# Patient Record
Sex: FEMALE | Race: BLACK OR AFRICAN AMERICAN | NOT HISPANIC OR LATINO | Employment: FULL TIME | URBAN - METROPOLITAN AREA
[De-identification: names, ages, dates, MRNs, and addresses within clinical notes are randomized per-mention and may not be internally consistent; named-entity substitution may affect disease eponyms.]

---

## 2017-05-17 LAB
EXTERNAL HEPATITIS B SURFACE ANTIGEN: NEGATIVE
EXTERNAL RUBELLA QUALITATIVE: NORMAL
EXTERNAL SYPHILIS RPR SCREEN: NORMAL
EXTERNAL URINE DRUG SCREEN: NEGATIVE
HIV1 P24 AG SERPL QL IA: NORMAL

## 2017-08-02 ENCOUNTER — TRANSCRIBE ORDERS (OUTPATIENT)
Dept: WOMENS IMAGING | Facility: HOSPITAL | Age: 37
End: 2017-08-02

## 2017-08-02 DIAGNOSIS — Z36.89 SCREENING, ANTENATAL, FOR FETAL ANATOMIC SURVEY: ICD-10-CM

## 2017-08-02 DIAGNOSIS — O09.522 AMA (ADVANCED MATERNAL AGE) MULTIGRAVIDA 35+, SECOND TRIMESTER: Primary | ICD-10-CM

## 2017-08-16 ENCOUNTER — OFFICE VISIT (OUTPATIENT)
Dept: OBSTETRICS AND GYNECOLOGY | Facility: HOSPITAL | Age: 37
End: 2017-08-16

## 2017-08-16 ENCOUNTER — HOSPITAL ENCOUNTER (OUTPATIENT)
Dept: WOMENS IMAGING | Facility: HOSPITAL | Age: 37
Discharge: HOME | End: 2017-08-16
Attending: OBSTETRICS & GYNECOLOGY | Admitting: OBSTETRICS & GYNECOLOGY

## 2017-08-16 VITALS
SYSTOLIC BLOOD PRESSURE: 124 MMHG | HEIGHT: 62 IN | BODY MASS INDEX: 36.99 KG/M2 | DIASTOLIC BLOOD PRESSURE: 82 MMHG | WEIGHT: 201 LBS

## 2017-08-16 DIAGNOSIS — O09.512 AMA (ADVANCED MATERNAL AGE) PRIMIGRAVIDA 35+, SECOND TRIMESTER: Primary | ICD-10-CM

## 2017-08-16 DIAGNOSIS — O09.522 AMA (ADVANCED MATERNAL AGE) MULTIGRAVIDA 35+, SECOND TRIMESTER: ICD-10-CM

## 2017-08-16 DIAGNOSIS — Z36.89 SCREENING, ANTENATAL, FOR FETAL ANATOMIC SURVEY: ICD-10-CM

## 2017-08-16 PROBLEM — O09.519 AMA (ADVANCED MATERNAL AGE) PRIMIGRAVIDA 35+: Status: ACTIVE | Noted: 2017-08-16

## 2017-08-16 PROCEDURE — 76811 OB US DETAILED SNGL FETUS: CPT | Performed by: OBSTETRICS & GYNECOLOGY

## 2017-08-16 PROCEDURE — 76811 OB US DETAILED SNGL FETUS: CPT

## 2017-08-16 RX ORDER — PRENATAL WITH FERROUS FUM AND FOLIC ACID 3080; 920; 120; 400; 22; 1.84; 3; 20; 10; 1; 12; 200; 27; 25; 2 [IU]/1; [IU]/1; MG/1; [IU]/1; MG/1; MG/1; MG/1; MG/1; MG/1; MG/1; UG/1; MG/1; MG/1; MG/1; MG/1
1 TABLET ORAL DAILY
COMMUNITY

## 2017-08-16 NOTE — PROGRESS NOTES
Documentation of the ultasound findings, images, and interpretations with be available in the patient's Viewpoint report located in the Chart Review Imaging tab in [employer].

## 2017-10-25 LAB — EXTERNAL GTT 1 HOUR: 121

## 2017-12-06 ENCOUNTER — TRANSCRIBE ORDERS (OUTPATIENT)
Dept: LAB | Facility: HOSPITAL | Age: 37
End: 2017-12-06

## 2017-12-06 ENCOUNTER — LAB (OUTPATIENT)
Dept: LAB | Facility: HOSPITAL | Age: 37
End: 2017-12-06

## 2017-12-06 DIAGNOSIS — Z34.83 PRENATAL CARE, SUBSEQUENT PREGNANCY, THIRD TRIMESTER: ICD-10-CM

## 2017-12-06 DIAGNOSIS — Z34.83 PRENATAL CARE, SUBSEQUENT PREGNANCY, THIRD TRIMESTER: Primary | ICD-10-CM

## 2017-12-06 LAB — EXTERNAL GROUP B STREP ANTIGEN: NEGATIVE

## 2017-12-06 PROCEDURE — 87081 CULTURE SCREEN ONLY: CPT

## 2017-12-09 LAB — BACTERIA SPEC AEROBE CULT: NORMAL

## 2017-12-30 ENCOUNTER — HOSPITAL ENCOUNTER (OUTPATIENT)
Facility: HOSPITAL | Age: 37
Setting detail: OBSERVATION
Discharge: HOME | End: 2017-12-30
Attending: OBSTETRICS & GYNECOLOGY | Admitting: OBSTETRICS & GYNECOLOGY

## 2017-12-30 VITALS
BODY MASS INDEX: 39.75 KG/M2 | SYSTOLIC BLOOD PRESSURE: 127 MMHG | HEART RATE: 86 BPM | RESPIRATION RATE: 18 BRPM | TEMPERATURE: 98.2 F | WEIGHT: 216 LBS | DIASTOLIC BLOOD PRESSURE: 77 MMHG | HEIGHT: 62 IN

## 2017-12-30 PROBLEM — O47.1 FALSE LABOR AFTER 37 WEEKS OF GESTATION WITHOUT DELIVERY: Status: ACTIVE | Noted: 2017-12-30

## 2017-12-30 LAB
BACTERIA UR QL AUTO: ABNORMAL /HPF
BILIRUB UR QL STRIP: NEGATIVE
CLARITY UR: ABNORMAL
COLOR UR: YELLOW
GLUCOSE UR STRIP-MCNC: NEGATIVE MG/DL
HGB UR QL STRIP.AUTO: NEGATIVE
HYALINE CASTS UR QL AUTO: ABNORMAL /LPF
KETONES UR QL STRIP: NEGATIVE
LEUKOCYTE ESTERASE UR QL STRIP.AUTO: NEGATIVE
NITRITE UR QL STRIP: NEGATIVE
PH UR STRIP.AUTO: 6.5 [PH] (ref 5–8)
PROT UR QL STRIP: NEGATIVE
RBC # UR: ABNORMAL /HPF
REF LAB TEST METHOD: ABNORMAL
SP GR UR STRIP: 1.01 (ref 1–1.03)
SQUAMOUS #/AREA URNS HPF: ABNORMAL /HPF
UROBILINOGEN UR QL STRIP: ABNORMAL
WBC UR QL AUTO: ABNORMAL /HPF

## 2017-12-30 PROCEDURE — G0378 HOSPITAL OBSERVATION PER HR: HCPCS

## 2017-12-30 PROCEDURE — 99218 PR INITIAL OBSERVATION CARE/DAY 30 MINUTES: CPT | Performed by: OBSTETRICS & GYNECOLOGY

## 2017-12-30 PROCEDURE — 59025 FETAL NON-STRESS TEST: CPT

## 2017-12-30 PROCEDURE — 81001 URINALYSIS AUTO W/SCOPE: CPT | Performed by: OBSTETRICS & GYNECOLOGY

## 2017-12-30 PROCEDURE — 59025 FETAL NON-STRESS TEST: CPT | Performed by: OBSTETRICS & GYNECOLOGY

## 2018-01-01 ENCOUNTER — HOSPITAL ENCOUNTER (OUTPATIENT)
Facility: HOSPITAL | Age: 38
Discharge: HOME | End: 2018-01-01
Attending: OBSTETRICS & GYNECOLOGY | Admitting: OBSTETRICS & GYNECOLOGY

## 2018-01-01 VITALS
WEIGHT: 216 LBS | DIASTOLIC BLOOD PRESSURE: 70 MMHG | SYSTOLIC BLOOD PRESSURE: 118 MMHG | RESPIRATION RATE: 16 BRPM | BODY MASS INDEX: 39.75 KG/M2 | TEMPERATURE: 98.1 F | HEART RATE: 78 BPM | HEIGHT: 62 IN

## 2018-01-01 PROCEDURE — 99214 OFFICE O/P EST MOD 30 MIN: CPT | Performed by: OBSTETRICS & GYNECOLOGY

## 2018-01-01 PROCEDURE — 59025 FETAL NON-STRESS TEST: CPT

## 2018-01-01 PROCEDURE — 59025 FETAL NON-STRESS TEST: CPT | Performed by: OBSTETRICS & GYNECOLOGY

## 2018-01-01 PROCEDURE — G0463 HOSPITAL OUTPT CLINIC VISIT: HCPCS

## 2018-01-01 NOTE — H&P
[facility]  Obstetric History and Physical    Chief Complaint   Patient presents with   • Decreaed fetal movement     Noticed decreaed movement around 0500   • Rupture of Membranes     ?? Leaking fluid around 0455. Pt denies any sexual intercourse in the last 4 days.       Subjective     Patient is a 37 y.o. female  currently at 39w6d, who presents with a complaint of possible breaking her water and some contractions.   Since arrival, she has had no leaking or bleeding.  +FM.  Her contractions have all but stopped.  She feels the baby move well and has a reactive strip.  She says she feels well and is ready to go home.  She has an appointment on Wednesday        The following portions of the patients history were reviewed and updated as appropriate: current medications, allergies, past medical history, past surgical history, past family history, past social history and problem list .       Prenatal Information:   Prenatal Labs  Lab Results   Component Value Date    GBSANTIGEN Negative 2017         External Prenatal Results         Pregnancy Outside Results - these were transcribed from office records.  See scanned records for details. Date Time   Hgb      Hct      ABO      Rh      Antibody Screen      Glucose Fasting GTT      Glucose Tolerance Test 1 hour      Glucose Tolerance Test 3 hour      Gonorrhea (discrete)      Chlamydia (discrete)      RPR      VDRL      Syphillis Antibody      Rubella      HBsAg      Herpes Simplex Virus PCR      Herpes Simplex VIrus Culture      HIV      Hep C RNA Quant PCR      Hep C Antibody      Urine Drug Screen      AFP      Group B Strep ^ Negative  17    GBS Susceptibility to Clindamycin      GBS Susceptibility to Eythromycin      Fetal Fibronectin      Genetic Testing, Maternal Blood             Legend: ^: Historical            Past OB History:       Obstetric History       T0      L0     SAB0   TAB0   Ectopic0   Multiple0   Live Births0       #  Outcome Date GA Lbr Pk/2nd Weight Sex Delivery Anes PTL Lv   1 Current                   Past Medical History: Past Medical History:   Diagnosis Date   • Abnormal Pap smear of cervix       Past Surgical History History reviewed. No pertinent surgical history.   Family History: History reviewed. No pertinent family history.   Social History:  reports that she quit smoking about 10 months ago. Her smoking use included Cigarettes. She smoked 0.25 packs per day. She has never used smokeless tobacco.   reports that she does not drink alcohol.   reports that she does not use illicit drugs.        General ROS: Reviewed within EPIC.  All pertinent negative except as noted    Objective       Vital Signs Range for the last 24 hours  Temperature: Temp:  [98.1 °F (36.7 °C)-98.4 °F (36.9 °C)] 98.1 °F (36.7 °C)   Temp Source: Temp src: Oral   BP: BP: (118-131)/(69-70) 118/70   Pulse: Heart Rate:  [78-93] 78   Respirations: Resp:  [16-18] 16   SPO2:     O2 Amount (l/min):     O2 Devices     Weight: Weight:  [98 kg (216 lb)] 98 kg (216 lb)     Physical Examination: Alert and oriented X 3  Chest - clear to auscultation, no wheezes, rales or rhonchi, symmetric air entry  Heart - normal rate, S1, S2, no murmurs  Abdomen - no rebound tenderness noted  bowel sounds normal  Gravid uterus, No RUQ pain, No guarding  Extremities - Non-tender bilaterally    Presentation: Cephalic   Cervix: Exam by: Method: sterile exam per physician   Dilation: Dilation: 0.5   Effacement:     Station:         Fetal Heart Rate Assessment   Method: Fetal HR Assessment Method: external   Beats/min: Fetal HR (Beats/Min): 130   Baseline: Fetal HR Baseline: normal range (110-160 bpm)   Varibility: Fetal HR Variability: moderate (amplitude range 6 to 25 bpm)   Accels: Fetal HR Accelerations: greater than/equal to 15 bpm, lasting at least 15 seconds   Decels: Fetal HR Decelerations: absent   Tracing Category:       Uterine Assessment   Method: Method: TOCO  (external toco transducer)   Frequency (min): Contraction Frequency (min): x1   Ctx Count in 10 min: Contraction Frequency in 10min: 1   Duration: Contraction Duration (sec): 140   Intensity: Contraction Intensity: mild by palpation   Intensity by IUPC:     Resting Tone: Uterine Resting Tone: soft by palpation   Resting Tone by IUPC:     Marcelino Units:       milagros Chatman  at 39w6d with an CEFERINO of 2018, by Other Basis,  Non stress test.    Indication :  Triage  Start time :  07:30  End time : 08:00  15 x 15 accelerations x 2  Yes  No decelerations  Baseline  Fetal HR Baseline: normal range (110-160 bpm)  Reactive NST  Yes            Assessment:  1.  Intrauterine pregnancy at 39w6d weeks gestation with reassuring fetal status.    2.  False labour not ruptured    Plan:  1. FHTs  Reactive NST  2. No cervical change or signs and symptoms of SROM or labour  3. Reviewed labour guidelines  4. All questions have been answered.  5. Discharge home with routine OB follow-up      Reji Cagle MD  2018  8:08 AM

## 2018-01-03 ENCOUNTER — HOSPITAL ENCOUNTER (INPATIENT)
Facility: HOSPITAL | Age: 38
LOS: 5 days | Discharge: HOME | End: 2018-01-08
Attending: OBSTETRICS & GYNECOLOGY | Admitting: OBSTETRICS & GYNECOLOGY

## 2018-01-03 DIAGNOSIS — O41.1230 CHORIOAMNIONITIS IN THIRD TRIMESTER, SINGLE OR UNSPECIFIED FETUS: Primary | ICD-10-CM

## 2018-01-03 DIAGNOSIS — O48.0 POST-TERM PREGNANCY, 40-42 WEEKS OF GESTATION: ICD-10-CM

## 2018-01-03 LAB
ABO GROUP BLD: NORMAL
BLD GP AB SCN SERPL QL: NEGATIVE
DEPRECATED RDW RBC AUTO: 45.8 FL (ref 37–54)
ERYTHROCYTE [DISTWIDTH] IN BLOOD BY AUTOMATED COUNT: 13.2 % (ref 11.3–14.5)
EXTERNAL ABO GROUPING: NORMAL
EXTERNAL ANTIBODY SCREEN: NEGATIVE
EXTERNAL RH FACTOR: POSITIVE
HCT VFR BLD AUTO: 34.4 % (ref 34.5–44)
HGB BLD-MCNC: 11.2 G/DL (ref 11.5–15.5)
MCH RBC QN AUTO: 31.4 PG (ref 27–31)
MCHC RBC AUTO-ENTMCNC: 32.6 G/DL (ref 32–36)
MCV RBC AUTO: 96.4 FL (ref 80–99)
PLATELET # BLD AUTO: 303 10*3/MM3 (ref 150–450)
PMV BLD AUTO: 9.5 FL (ref 6–12)
RBC # BLD AUTO: 3.57 10*6/MM3 (ref 3.89–5.14)
RH BLD: POSITIVE
WBC NRBC COR # BLD: 6.5 10*3/MM3 (ref 3.5–10.8)

## 2018-01-03 PROCEDURE — 86901 BLOOD TYPING SEROLOGIC RH(D): CPT | Performed by: OBSTETRICS & GYNECOLOGY

## 2018-01-03 PROCEDURE — 86900 BLOOD TYPING SEROLOGIC ABO: CPT | Performed by: OBSTETRICS & GYNECOLOGY

## 2018-01-03 PROCEDURE — 85027 COMPLETE CBC AUTOMATED: CPT | Performed by: OBSTETRICS & GYNECOLOGY

## 2018-01-03 PROCEDURE — 86850 RBC ANTIBODY SCREEN: CPT | Performed by: OBSTETRICS & GYNECOLOGY

## 2018-01-03 PROCEDURE — 59200 INSERT CERVICAL DILATOR: CPT | Performed by: OBSTETRICS & GYNECOLOGY

## 2018-01-03 PROCEDURE — 59025 FETAL NON-STRESS TEST: CPT

## 2018-01-03 RX ORDER — OXYTOCIN-SODIUM CHLORIDE 0.9% IV SOLN 30 UNIT/500ML 30-0.9/5 UT/ML-%
2 SOLUTION INTRAVENOUS
Status: DISCONTINUED | OUTPATIENT
Start: 2018-01-03 | End: 2018-01-03

## 2018-01-03 RX ORDER — PROMETHAZINE HYDROCHLORIDE 12.5 MG/1
12.5 SUPPOSITORY RECTAL EVERY 6 HOURS PRN
Status: DISCONTINUED | OUTPATIENT
Start: 2018-01-03 | End: 2018-01-04 | Stop reason: HOSPADM

## 2018-01-03 RX ORDER — SODIUM CHLORIDE, SODIUM LACTATE, POTASSIUM CHLORIDE, CALCIUM CHLORIDE 600; 310; 30; 20 MG/100ML; MG/100ML; MG/100ML; MG/100ML
125 INJECTION, SOLUTION INTRAVENOUS CONTINUOUS
Status: DISCONTINUED | OUTPATIENT
Start: 2018-01-03 | End: 2018-01-04 | Stop reason: SDUPTHER

## 2018-01-03 RX ORDER — MISOPROSTOL 100 MCG
25 TABLET ORAL ONCE
Status: COMPLETED | OUTPATIENT
Start: 2018-01-03 | End: 2018-01-03

## 2018-01-03 RX ORDER — SODIUM CHLORIDE 0.9 % (FLUSH) 0.9 %
1-10 SYRINGE (ML) INJECTION AS NEEDED
Status: DISCONTINUED | OUTPATIENT
Start: 2018-01-03 | End: 2018-01-04 | Stop reason: HOSPADM

## 2018-01-03 RX ORDER — OXYTOCIN-SODIUM CHLORIDE 0.9% IV SOLN 30 UNIT/500ML 30-0.9/5 UT/ML-%
2-30 SOLUTION INTRAVENOUS
Status: DISCONTINUED | OUTPATIENT
Start: 2018-01-04 | End: 2018-01-04 | Stop reason: HOSPADM

## 2018-01-03 RX ORDER — MAGNESIUM CARB/ALUMINUM HYDROX 105-160MG
30 TABLET,CHEWABLE ORAL ONCE
Status: DISCONTINUED | OUTPATIENT
Start: 2018-01-03 | End: 2018-01-04

## 2018-01-03 RX ORDER — SODIUM CHLORIDE, SODIUM LACTATE, POTASSIUM CHLORIDE, CALCIUM CHLORIDE 600; 310; 30; 20 MG/100ML; MG/100ML; MG/100ML; MG/100ML
125 INJECTION, SOLUTION INTRAVENOUS CONTINUOUS
Status: DISCONTINUED | OUTPATIENT
Start: 2018-01-03 | End: 2018-01-04 | Stop reason: HOSPADM

## 2018-01-03 RX ORDER — LIDOCAINE HYDROCHLORIDE 10 MG/ML
5 INJECTION, SOLUTION INFILTRATION; PERINEURAL AS NEEDED
Status: DISCONTINUED | OUTPATIENT
Start: 2018-01-03 | End: 2018-01-04 | Stop reason: HOSPADM

## 2018-01-03 RX ORDER — PROMETHAZINE HYDROCHLORIDE 25 MG/ML
12.5 INJECTION, SOLUTION INTRAMUSCULAR; INTRAVENOUS EVERY 6 HOURS PRN
Status: DISCONTINUED | OUTPATIENT
Start: 2018-01-03 | End: 2018-01-04 | Stop reason: HOSPADM

## 2018-01-03 RX ORDER — PROMETHAZINE HYDROCHLORIDE 12.5 MG/1
12.5 TABLET ORAL EVERY 6 HOURS PRN
Status: DISCONTINUED | OUTPATIENT
Start: 2018-01-03 | End: 2018-01-04 | Stop reason: HOSPADM

## 2018-01-03 RX ADMIN — SODIUM CHLORIDE, POTASSIUM CHLORIDE, SODIUM LACTATE AND CALCIUM CHLORIDE 125 ML/HR: 600; 310; 30; 20 INJECTION, SOLUTION INTRAVENOUS at 19:36

## 2018-01-03 RX ADMIN — OXYTOCIN 2 MILLI-UNITS/MIN: 10 INJECTION INTRAVENOUS at 14:08

## 2018-01-03 RX ADMIN — SODIUM CHLORIDE, POTASSIUM CHLORIDE, SODIUM LACTATE AND CALCIUM CHLORIDE 125 ML/HR: 600; 310; 30; 20 INJECTION, SOLUTION INTRAVENOUS at 13:43

## 2018-01-03 RX ADMIN — MISOPROSTOL 25 MCG: 100 TABLET ORAL at 21:19

## 2018-01-03 NOTE — PLAN OF CARE
Problem: Patient Care Overview (Adult)  Goal: Plan of Care Review  Outcome: Ongoing (interventions implemented as appropriate)   01/03/18 1726   Patient Care Overview   Progress progress toward functional goals as expected     Goal: Adult Individualization and Mutuality  Outcome: Ongoing (interventions implemented as appropriate)   01/03/18 1726   Individualization   Patient Specific Preferences undecided on epidural   Patient Specific Goals healthy mom, healthy baby!   Patient Specific Interventions education provided; pitocin started   Mutuality/Individual Preferences   What Anxieties, Fears or Concerns Do You Have About Your Health or Care? none   What Questions Do You Have About Your Health or Care? none   What Information Would Help Us Give You More Personalized Care? aisha     Goal: Discharge Needs Assessment  Outcome: Ongoing (interventions implemented as appropriate)   01/03/18 1726   Discharge Needs Assessment   Concerns To Be Addressed denies needs/concerns at this time   Equipment Needed After Discharge none   Discharge Disposition still a patient   Current Health   Anticipated Changes Related to Illness none   Self-Care   Equipment Currently Used at Home none   Living Environment   Transportation Available car       Problem: Labor (Cervical Ripen, Induct, Augment) (Adult,Obstetrics,Pediatric)  Intervention: Position/Reposition to Promote Fetal Well Being/Optimize Contraction Pattern   01/03/18 1632 01/03/18 1726   Maternal/Fetal Interventions   Activity In Labor --  bed rest with bathroom privileges   Positioning   Positioning semi-Fowlers --      Intervention: Monitor/Manage Labor Dystocia   01/03/18 1726   Maternal/Fetal Interventions   Labor Interventions fetal heart rate monitored     Intervention: Assess for/Assist with Variations in Fetal Presentation   01/03/18 1632   Positioning   Positioning semi-Fowlers     Intervention: Monitor/Manage Labor Pain   01/03/18 1726   Manage Acute Burn Pain   Pain  Management Interventions no interventions per patient request     Intervention: Provide Emotional Support and Encouragement   18 1242   Coping Strategies   Trust Relationship/Rapport care explained;emotional support provided;choices provided;empathic listening provided;questions answered;questions encouraged;reassurance provided;thoughts/feelings acknowledged     Intervention: Monitor/Manage Maternal Hemodynamic Stability   18 1726   Cardiac Interventions    Bleed Management Rh status confirmed       Goal: Signs and Symptoms of Listed Potential Problems Will be Absent or Manageable (Labor)  Outcome: Ongoing (interventions implemented as appropriate)   18 1726   Labor (Cervical Ripen, Induct, Augment)   Problems Assessed (Labor) all   Problems Present (Labor) none

## 2018-01-04 ENCOUNTER — ANESTHESIA (OUTPATIENT)
Dept: LABOR AND DELIVERY | Facility: HOSPITAL | Age: 38
End: 2018-01-04

## 2018-01-04 ENCOUNTER — ANESTHESIA EVENT (OUTPATIENT)
Dept: LABOR AND DELIVERY | Facility: HOSPITAL | Age: 38
End: 2018-01-04

## 2018-01-04 LAB
ATMOSPHERIC PRESS: ABNORMAL MMHG
ATMOSPHERIC PRESS: ABNORMAL MMHG
BASE EXCESS BLDCOA CALC-SCNC: -5.6 MMOL/L (ref 0–2)
BASE EXCESS BLDCOV CALC-SCNC: -6 MMOL/L (ref 0–2)
BDY SITE: ABNORMAL
CO2 BLDA-SCNC: 20.8 MMOL/L (ref 22–33)
CO2 BLDA-SCNC: 21 MMOL/L (ref 22–33)
HCO3 BLDCOA-SCNC: 19.8 MMOL/L (ref 16.9–20.5)
HCO3 BLDCOV-SCNC: 19.6 MMOL/L (ref 18.6–21.4)
HGB BLDA-MCNC: 14.5 G/DL (ref 14–18)
HGB BLDA-MCNC: 14.5 G/DL (ref 14–18)
HOROWITZ INDEX BLD+IHG-RTO: 21 %
HOROWITZ INDEX BLD+IHG-RTO: 21 %
MODALITY: ABNORMAL
MODALITY: ABNORMAL
PCO2 BLDCOA: 37.6 MMHG (ref 43.3–54.9)
PCO2 BLDCOV: 38.2 MM HG (ref 30–60)
PH BLDCOA: 7.33 PH UNITS (ref 7.2–7.3)
PH BLDCOV: 7.32 PH UNITS (ref 7.19–7.46)
PO2 BLDCOA: 23.6 MMHG (ref 11.5–43.3)
PO2 BLDCOV: 23.8 MM HG
SAO2 % BLDCOA: 48.9 %
SAO2 % BLDCOA: ABNORMAL % (ref 45–75)
SAO2 % BLDCOV: 48.1 %

## 2018-01-04 PROCEDURE — 25010000003 MORPHINE PER 10 MG: Performed by: NURSE ANESTHETIST, CERTIFIED REGISTERED

## 2018-01-04 PROCEDURE — 82805 BLOOD GASES W/O2 SATURATION: CPT | Performed by: OBSTETRICS & GYNECOLOGY

## 2018-01-04 PROCEDURE — 25010000002 PHENYLEPHRINE PER 1 ML: Performed by: NURSE ANESTHETIST, CERTIFIED REGISTERED

## 2018-01-04 PROCEDURE — C1755 CATHETER, INTRASPINAL: HCPCS

## 2018-01-04 PROCEDURE — 25010000002 FENTANYL CITRATE (PF) 100 MCG/2ML SOLUTION: Performed by: NURSE ANESTHETIST, CERTIFIED REGISTERED

## 2018-01-04 PROCEDURE — 25010000002 ROPIVACAINE PER 1 MG: Performed by: NURSE ANESTHETIST, CERTIFIED REGISTERED

## 2018-01-04 PROCEDURE — 25010000002 TERBUTALINE PER 1 MG: Performed by: OBSTETRICS & GYNECOLOGY

## 2018-01-04 PROCEDURE — 25010000003 CEFAZOLIN IN DEXTROSE 2-4 GM/100ML-% SOLUTION: Performed by: OBSTETRICS & GYNECOLOGY

## 2018-01-04 PROCEDURE — 25010000002 METHYLERGONOVINE MALEATE PER 0.2 MG: Performed by: NURSE ANESTHETIST, CERTIFIED REGISTERED

## 2018-01-04 PROCEDURE — 25010000002 GENTAMICIN PER 80 MG

## 2018-01-04 PROCEDURE — 88307 TISSUE EXAM BY PATHOLOGIST: CPT | Performed by: OBSTETRICS & GYNECOLOGY

## 2018-01-04 PROCEDURE — 25010000002 MIDAZOLAM PER 1 MG: Performed by: NURSE ANESTHETIST, CERTIFIED REGISTERED

## 2018-01-04 PROCEDURE — 59025 FETAL NON-STRESS TEST: CPT

## 2018-01-04 PROCEDURE — 25010000002 ONDANSETRON PER 1 MG: Performed by: NURSE ANESTHETIST, CERTIFIED REGISTERED

## 2018-01-04 PROCEDURE — C1755 CATHETER, INTRASPINAL: HCPCS | Performed by: ANESTHESIOLOGY

## 2018-01-04 PROCEDURE — 25010000002 BUTORPHANOL PER 1 MG: Performed by: OBSTETRICS & GYNECOLOGY

## 2018-01-04 RX ORDER — MORPHINE SULFATE 0.5 MG/ML
INJECTION, SOLUTION EPIDURAL; INTRATHECAL; INTRAVENOUS AS NEEDED
Status: DISCONTINUED | OUTPATIENT
Start: 2018-01-04 | End: 2018-01-04 | Stop reason: SURG

## 2018-01-04 RX ORDER — LANOLIN 100 %
OINTMENT (GRAM) TOPICAL
Status: DISCONTINUED | OUTPATIENT
Start: 2018-01-04 | End: 2018-01-08 | Stop reason: HOSPADM

## 2018-01-04 RX ORDER — HYDROCODONE BITARTRATE AND ACETAMINOPHEN 5; 325 MG/1; MG/1
2 TABLET ORAL EVERY 4 HOURS PRN
Status: DISCONTINUED | OUTPATIENT
Start: 2018-01-04 | End: 2018-01-08 | Stop reason: HOSPADM

## 2018-01-04 RX ORDER — FENTANYL CITRATE 50 UG/ML
INJECTION, SOLUTION INTRAMUSCULAR; INTRAVENOUS AS NEEDED
Status: DISCONTINUED | OUTPATIENT
Start: 2018-01-04 | End: 2018-01-04 | Stop reason: SURG

## 2018-01-04 RX ORDER — METHYLERGONOVINE MALEATE 0.2 MG/ML
INJECTION INTRAVENOUS AS NEEDED
Status: DISCONTINUED | OUTPATIENT
Start: 2018-01-04 | End: 2018-01-04 | Stop reason: SURG

## 2018-01-04 RX ORDER — METOCLOPRAMIDE HYDROCHLORIDE 5 MG/ML
10 INJECTION INTRAMUSCULAR; INTRAVENOUS ONCE AS NEEDED
Status: DISCONTINUED | OUTPATIENT
Start: 2018-01-04 | End: 2018-01-04 | Stop reason: HOSPADM

## 2018-01-04 RX ORDER — PROMETHAZINE HYDROCHLORIDE 25 MG/ML
12.5 INJECTION, SOLUTION INTRAMUSCULAR; INTRAVENOUS EVERY 6 HOURS PRN
Status: DISCONTINUED | OUTPATIENT
Start: 2018-01-04 | End: 2018-01-08 | Stop reason: HOSPADM

## 2018-01-04 RX ORDER — IBUPROFEN 600 MG/1
600 TABLET ORAL EVERY 6 HOURS PRN
Status: DISCONTINUED | OUTPATIENT
Start: 2018-01-04 | End: 2018-01-08 | Stop reason: HOSPADM

## 2018-01-04 RX ORDER — ONDANSETRON 4 MG/1
4 TABLET, FILM COATED ORAL EVERY 6 HOURS PRN
Status: DISCONTINUED | OUTPATIENT
Start: 2018-01-04 | End: 2018-01-04 | Stop reason: SDUPTHER

## 2018-01-04 RX ORDER — METHYLERGONOVINE MALEATE 0.2 MG/ML
200 INJECTION INTRAVENOUS ONCE AS NEEDED
Status: DISCONTINUED | OUTPATIENT
Start: 2018-01-04 | End: 2018-01-08 | Stop reason: HOSPADM

## 2018-01-04 RX ORDER — HYDROCODONE BITARTRATE AND ACETAMINOPHEN 5; 325 MG/1; MG/1
1 TABLET ORAL EVERY 4 HOURS PRN
Status: DISCONTINUED | OUTPATIENT
Start: 2018-01-04 | End: 2018-01-08 | Stop reason: HOSPADM

## 2018-01-04 RX ORDER — PROMETHAZINE HYDROCHLORIDE 12.5 MG/1
12.5 SUPPOSITORY RECTAL EVERY 6 HOURS PRN
Status: DISCONTINUED | OUTPATIENT
Start: 2018-01-04 | End: 2018-01-08 | Stop reason: HOSPADM

## 2018-01-04 RX ORDER — ONDANSETRON 4 MG/1
4 TABLET, FILM COATED ORAL EVERY 6 HOURS PRN
Status: DISCONTINUED | OUTPATIENT
Start: 2018-01-04 | End: 2018-01-08 | Stop reason: HOSPADM

## 2018-01-04 RX ORDER — DOCUSATE SODIUM 100 MG/1
100 CAPSULE, LIQUID FILLED ORAL 2 TIMES DAILY
Status: DISCONTINUED | OUTPATIENT
Start: 2018-01-04 | End: 2018-01-08 | Stop reason: HOSPADM

## 2018-01-04 RX ORDER — PRENATAL VIT/IRON FUM/FOLIC AC 27MG-0.8MG
1 TABLET ORAL DAILY
Status: DISCONTINUED | OUTPATIENT
Start: 2018-01-05 | End: 2018-01-05

## 2018-01-04 RX ORDER — MIDAZOLAM HYDROCHLORIDE 1 MG/ML
INJECTION INTRAMUSCULAR; INTRAVENOUS AS NEEDED
Status: DISCONTINUED | OUTPATIENT
Start: 2018-01-04 | End: 2018-01-04 | Stop reason: SURG

## 2018-01-04 RX ORDER — TERBUTALINE SULFATE 1 MG/ML
0.25 INJECTION, SOLUTION SUBCUTANEOUS ONCE
Status: COMPLETED | OUTPATIENT
Start: 2018-01-04 | End: 2018-01-04

## 2018-01-04 RX ORDER — LIDOCAINE HYDROCHLORIDE AND EPINEPHRINE 20; 5 MG/ML; UG/ML
INJECTION, SOLUTION EPIDURAL; INFILTRATION; INTRACAUDAL; PERINEURAL AS NEEDED
Status: DISCONTINUED | OUTPATIENT
Start: 2018-01-04 | End: 2018-01-04 | Stop reason: SURG

## 2018-01-04 RX ORDER — METHYLERGONOVINE MALEATE 0.2 MG/ML
200 INJECTION INTRAVENOUS ONCE AS NEEDED
Status: CANCELLED | OUTPATIENT
Start: 2018-01-04

## 2018-01-04 RX ORDER — EPHEDRINE SULFATE/0.9% NACL/PF 50 MG/10ML
10 SYRINGE (ML) INTRAVENOUS
Status: DISCONTINUED | OUTPATIENT
Start: 2018-01-04 | End: 2018-01-04 | Stop reason: HOSPADM

## 2018-01-04 RX ORDER — SODIUM CHLORIDE, SODIUM LACTATE, POTASSIUM CHLORIDE, CALCIUM CHLORIDE 600; 310; 30; 20 MG/100ML; MG/100ML; MG/100ML; MG/100ML
INJECTION, SOLUTION INTRAVENOUS CONTINUOUS PRN
Status: DISCONTINUED | OUTPATIENT
Start: 2018-01-04 | End: 2018-01-04 | Stop reason: SURG

## 2018-01-04 RX ORDER — GENTAMICIN SULFATE 100 MG/100ML
2 INJECTION, SOLUTION INTRAVENOUS ONCE
Status: DISCONTINUED | OUTPATIENT
Start: 2018-01-04 | End: 2018-01-04

## 2018-01-04 RX ORDER — OXYTOCIN/RINGER'S LACTATE 20/1000 ML
999 PLASTIC BAG, INJECTION (ML) INTRAVENOUS ONCE
Status: DISCONTINUED | OUTPATIENT
Start: 2018-01-04 | End: 2018-01-08 | Stop reason: HOSPADM

## 2018-01-04 RX ORDER — GENTAMICIN SULFATE 100 MG/100ML
1.5 INJECTION, SOLUTION INTRAVENOUS EVERY 8 HOURS
Status: DISCONTINUED | OUTPATIENT
Start: 2018-01-04 | End: 2018-01-06

## 2018-01-04 RX ORDER — CEFAZOLIN SODIUM 2 G/100ML
2 INJECTION, SOLUTION INTRAVENOUS EVERY 8 HOURS SCHEDULED
Status: COMPLETED | OUTPATIENT
Start: 2018-01-04 | End: 2018-01-06

## 2018-01-04 RX ORDER — GENTAMICIN SULFATE 80 MG/100ML
1.5 INJECTION, SOLUTION INTRAVENOUS EVERY 8 HOURS
Status: DISCONTINUED | OUTPATIENT
Start: 2018-01-04 | End: 2018-01-04

## 2018-01-04 RX ORDER — TRISODIUM CITRATE DIHYDRATE AND CITRIC ACID MONOHYDRATE 500; 334 MG/5ML; MG/5ML
30 SOLUTION ORAL ONCE
Status: COMPLETED | OUTPATIENT
Start: 2018-01-04 | End: 2018-01-04

## 2018-01-04 RX ORDER — CEFAZOLIN SODIUM 2 G/100ML
2 INJECTION, SOLUTION INTRAVENOUS EVERY 8 HOURS SCHEDULED
Status: DISCONTINUED | OUTPATIENT
Start: 2018-01-04 | End: 2018-01-04

## 2018-01-04 RX ORDER — ALUMINA, MAGNESIA, AND SIMETHICONE 2400; 2400; 240 MG/30ML; MG/30ML; MG/30ML
15 SUSPENSION ORAL EVERY 4 HOURS PRN
Status: DISCONTINUED | OUTPATIENT
Start: 2018-01-04 | End: 2018-01-08 | Stop reason: HOSPADM

## 2018-01-04 RX ORDER — DIPHENHYDRAMINE HCL 25 MG
25 CAPSULE ORAL EVERY 4 HOURS PRN
Status: DISCONTINUED | OUTPATIENT
Start: 2018-01-04 | End: 2018-01-08 | Stop reason: HOSPADM

## 2018-01-04 RX ORDER — ONDANSETRON 2 MG/ML
4 INJECTION INTRAMUSCULAR; INTRAVENOUS EVERY 6 HOURS PRN
Status: DISCONTINUED | OUTPATIENT
Start: 2018-01-04 | End: 2018-01-04 | Stop reason: SDUPTHER

## 2018-01-04 RX ORDER — TRISODIUM CITRATE DIHYDRATE AND CITRIC ACID MONOHYDRATE 500; 334 MG/5ML; MG/5ML
30 SOLUTION ORAL ONCE
Status: DISCONTINUED | OUTPATIENT
Start: 2018-01-04 | End: 2018-01-04 | Stop reason: SDUPTHER

## 2018-01-04 RX ORDER — DIPHENHYDRAMINE HYDROCHLORIDE 50 MG/ML
12.5 INJECTION INTRAMUSCULAR; INTRAVENOUS EVERY 8 HOURS PRN
Status: DISCONTINUED | OUTPATIENT
Start: 2018-01-04 | End: 2018-01-04 | Stop reason: HOSPADM

## 2018-01-04 RX ORDER — SODIUM CHLORIDE, SODIUM LACTATE, POTASSIUM CHLORIDE, CALCIUM CHLORIDE 600; 310; 30; 20 MG/100ML; MG/100ML; MG/100ML; MG/100ML
125 INJECTION, SOLUTION INTRAVENOUS CONTINUOUS
Status: DISCONTINUED | OUTPATIENT
Start: 2018-01-04 | End: 2018-01-08 | Stop reason: HOSPADM

## 2018-01-04 RX ORDER — ROPIVACAINE HYDROCHLORIDE 2 MG/ML
14 INJECTION, SOLUTION EPIDURAL; INFILTRATION; PERINEURAL CONTINUOUS
Status: DISCONTINUED | OUTPATIENT
Start: 2018-01-04 | End: 2018-01-04 | Stop reason: HOSPADM

## 2018-01-04 RX ORDER — OXYCODONE HYDROCHLORIDE AND ACETAMINOPHEN 5; 325 MG/1; MG/1
2 TABLET ORAL EVERY 4 HOURS PRN
Status: DISCONTINUED | OUTPATIENT
Start: 2018-01-04 | End: 2018-01-08 | Stop reason: HOSPADM

## 2018-01-04 RX ORDER — ONDANSETRON 2 MG/ML
4 INJECTION INTRAMUSCULAR; INTRAVENOUS ONCE AS NEEDED
Status: COMPLETED | OUTPATIENT
Start: 2018-01-04 | End: 2018-01-04

## 2018-01-04 RX ORDER — OXYTOCIN 10 [USP'U]/ML
INJECTION, SOLUTION INTRAMUSCULAR; INTRAVENOUS AS NEEDED
Status: DISCONTINUED | OUTPATIENT
Start: 2018-01-04 | End: 2018-01-04 | Stop reason: SURG

## 2018-01-04 RX ORDER — SODIUM CHLORIDE, SODIUM LACTATE, POTASSIUM CHLORIDE, CALCIUM CHLORIDE 600; 310; 30; 20 MG/100ML; MG/100ML; MG/100ML; MG/100ML
100 INJECTION, SOLUTION INTRAVENOUS CONTINUOUS
Status: DISCONTINUED | OUTPATIENT
Start: 2018-01-04 | End: 2018-01-04

## 2018-01-04 RX ORDER — CALCIUM CARBONATE 200(500)MG
2 TABLET,CHEWABLE ORAL 3 TIMES DAILY PRN
Status: DISCONTINUED | OUTPATIENT
Start: 2018-01-04 | End: 2018-01-08 | Stop reason: HOSPADM

## 2018-01-04 RX ORDER — OXYTOCIN/RINGER'S LACTATE 20/1000 ML
125 PLASTIC BAG, INJECTION (ML) INTRAVENOUS CONTINUOUS PRN
Status: DISPENSED | OUTPATIENT
Start: 2018-01-04 | End: 2018-01-05

## 2018-01-04 RX ORDER — ONDANSETRON 2 MG/ML
4 INJECTION INTRAMUSCULAR; INTRAVENOUS EVERY 6 HOURS PRN
Status: DISCONTINUED | OUTPATIENT
Start: 2018-01-04 | End: 2018-01-08 | Stop reason: HOSPADM

## 2018-01-04 RX ORDER — PROMETHAZINE HYDROCHLORIDE 12.5 MG/1
12.5 TABLET ORAL EVERY 6 HOURS PRN
Status: DISCONTINUED | OUTPATIENT
Start: 2018-01-04 | End: 2018-01-08 | Stop reason: HOSPADM

## 2018-01-04 RX ORDER — ACETAMINOPHEN 500 MG
1000 TABLET ORAL EVERY 6 HOURS PRN
Status: DISCONTINUED | OUTPATIENT
Start: 2018-01-04 | End: 2018-01-04 | Stop reason: HOSPADM

## 2018-01-04 RX ORDER — SIMETHICONE 80 MG
80 TABLET,CHEWABLE ORAL 4 TIMES DAILY
Status: DISCONTINUED | OUTPATIENT
Start: 2018-01-04 | End: 2018-01-08 | Stop reason: HOSPADM

## 2018-01-04 RX ORDER — SIMETHICONE 80 MG
80 TABLET,CHEWABLE ORAL 4 TIMES DAILY PRN
Status: DISCONTINUED | OUTPATIENT
Start: 2018-01-04 | End: 2018-01-08 | Stop reason: HOSPADM

## 2018-01-04 RX ORDER — FAMOTIDINE 10 MG/ML
20 INJECTION, SOLUTION INTRAVENOUS ONCE AS NEEDED
Status: DISCONTINUED | OUTPATIENT
Start: 2018-01-04 | End: 2018-01-04 | Stop reason: HOSPADM

## 2018-01-04 RX ADMIN — OXYTOCIN 30 UNITS: 10 INJECTION, SOLUTION INTRAMUSCULAR; INTRAVENOUS at 14:37

## 2018-01-04 RX ADMIN — BUTORPHANOL TARTRATE 2 MG: 2 INJECTION, SOLUTION INTRAMUSCULAR; INTRAVENOUS at 02:04

## 2018-01-04 RX ADMIN — CEFAZOLIN SODIUM 2 G: 2 INJECTION, SOLUTION INTRAVENOUS at 21:56

## 2018-01-04 RX ADMIN — SODIUM CHLORIDE, POTASSIUM CHLORIDE, SODIUM LACTATE AND CALCIUM CHLORIDE 125 ML/HR: 600; 310; 30; 20 INJECTION, SOLUTION INTRAVENOUS at 13:25

## 2018-01-04 RX ADMIN — SODIUM CHLORIDE, POTASSIUM CHLORIDE, SODIUM LACTATE AND CALCIUM CHLORIDE 1000 ML/HR: 600; 310; 30; 20 INJECTION, SOLUTION INTRAVENOUS at 07:28

## 2018-01-04 RX ADMIN — FENTANYL CITRATE 100 MCG: 50 INJECTION, SOLUTION INTRAMUSCULAR; INTRAVENOUS at 08:23

## 2018-01-04 RX ADMIN — OXYTOCIN 2 MILLI-UNITS/MIN: 10 INJECTION INTRAVENOUS at 05:02

## 2018-01-04 RX ADMIN — SODIUM CHLORIDE, POTASSIUM CHLORIDE, SODIUM LACTATE AND CALCIUM CHLORIDE 1000 ML: 600; 310; 30; 20 INJECTION, SOLUTION INTRAVENOUS at 12:55

## 2018-01-04 RX ADMIN — SODIUM CHLORIDE, POTASSIUM CHLORIDE, SODIUM LACTATE AND CALCIUM CHLORIDE 100 ML/HR: 600; 310; 30; 20 INJECTION, SOLUTION INTRAVENOUS at 09:23

## 2018-01-04 RX ADMIN — GENTAMICIN SULFATE 140 MG: 40 INJECTION, SOLUTION INTRAMUSCULAR; INTRAVENOUS at 08:38

## 2018-01-04 RX ADMIN — TERBUTALINE SULFATE 0.25 MG: 1 INJECTION, SOLUTION SUBCUTANEOUS at 12:03

## 2018-01-04 RX ADMIN — PHENYLEPHRINE HYDROCHLORIDE 100 MCG: 10 INJECTION INTRAVENOUS at 14:40

## 2018-01-04 RX ADMIN — ROPIVACAINE HYDROCHLORIDE 10 ML: 5 INJECTION, SOLUTION EPIDURAL; INFILTRATION; PERINEURAL at 08:23

## 2018-01-04 RX ADMIN — LIDOCAINE HYDROCHLORIDE,EPINEPHRINE BITARTRATE 2 ML: 20; .005 INJECTION, SOLUTION EPIDURAL; INFILTRATION; INTRACAUDAL; PERINEURAL at 08:20

## 2018-01-04 RX ADMIN — ACETAMINOPHEN 1000 MG: 500 TABLET ORAL at 06:51

## 2018-01-04 RX ADMIN — METHYLERGONOVINE MALEATE 200 MCG: 0.2 INJECTION INTRAMUSCULAR; INTRAVENOUS at 14:20

## 2018-01-04 RX ADMIN — ROPIVACAINE HYDROCHLORIDE 14 ML/HR: 2 INJECTION, SOLUTION EPIDURAL; INFILTRATION at 08:27

## 2018-01-04 RX ADMIN — SODIUM CHLORIDE, POTASSIUM CHLORIDE, SODIUM LACTATE AND CALCIUM CHLORIDE 125 ML/HR: 600; 310; 30; 20 INJECTION, SOLUTION INTRAVENOUS at 06:40

## 2018-01-04 RX ADMIN — SODIUM CITRATE AND CITRIC ACID MONOHYDRATE 30 ML: 500; 334 SOLUTION ORAL at 13:39

## 2018-01-04 RX ADMIN — MORPHINE SULFATE 1 MG: 0.5 INJECTION, SOLUTION EPIDURAL; INTRATHECAL; INTRAVENOUS at 14:28

## 2018-01-04 RX ADMIN — CEFAZOLIN SODIUM 2 G: 2 INJECTION, SOLUTION INTRAVENOUS at 13:38

## 2018-01-04 RX ADMIN — SODIUM CHLORIDE, POTASSIUM CHLORIDE, SODIUM LACTATE AND CALCIUM CHLORIDE 125 ML/HR: 600; 310; 30; 20 INJECTION, SOLUTION INTRAVENOUS at 08:04

## 2018-01-04 RX ADMIN — MIDAZOLAM HYDROCHLORIDE 1 MG: 1 INJECTION, SOLUTION INTRAMUSCULAR; INTRAVENOUS at 14:22

## 2018-01-04 RX ADMIN — ONDANSETRON 4 MG: 2 INJECTION INTRAMUSCULAR; INTRAVENOUS at 13:38

## 2018-01-04 RX ADMIN — FENTANYL CITRATE 25 MCG: 50 INJECTION, SOLUTION INTRAMUSCULAR; INTRAVENOUS at 14:25

## 2018-01-04 RX ADMIN — GENTAMICIN SULFATE 100 MG: 100 INJECTION, SOLUTION INTRAVENOUS at 16:10

## 2018-01-04 RX ADMIN — LIDOCAINE HYDROCHLORIDE,EPINEPHRINE BITARTRATE 5 ML: 20; .005 INJECTION, SOLUTION EPIDURAL; INFILTRATION; INTRACAUDAL; PERINEURAL at 12:39

## 2018-01-04 RX ADMIN — SODIUM CHLORIDE, POTASSIUM CHLORIDE, SODIUM LACTATE AND CALCIUM CHLORIDE 1000 ML/HR: 600; 310; 30; 20 INJECTION, SOLUTION INTRAVENOUS at 07:43

## 2018-01-04 RX ADMIN — OXYCODONE AND ACETAMINOPHEN 2 TABLET: 5; 325 TABLET ORAL at 15:50

## 2018-01-04 RX ADMIN — BUTORPHANOL TARTRATE 2 MG: 2 INJECTION, SOLUTION INTRAMUSCULAR; INTRAVENOUS at 04:02

## 2018-01-04 RX ADMIN — GENTAMICIN SULFATE 100 MG: 100 INJECTION, SOLUTION INTRAVENOUS at 23:43

## 2018-01-04 RX ADMIN — ROPIVACAINE HYDROCHLORIDE 0 ML: 5 INJECTION, SOLUTION EPIDURAL; INFILTRATION; PERINEURAL at 08:23

## 2018-01-04 RX ADMIN — SODIUM CHLORIDE, POTASSIUM CHLORIDE, SODIUM LACTATE AND CALCIUM CHLORIDE: 600; 310; 30; 20 INJECTION, SOLUTION INTRAVENOUS at 14:36

## 2018-01-04 RX ADMIN — SODIUM CHLORIDE, POTASSIUM CHLORIDE, SODIUM LACTATE AND CALCIUM CHLORIDE 125 ML/HR: 600; 310; 30; 20 INJECTION, SOLUTION INTRAVENOUS at 03:29

## 2018-01-04 RX ADMIN — IBUPROFEN 600 MG: 600 TABLET, FILM COATED ORAL at 15:50

## 2018-01-04 RX ADMIN — SODIUM CHLORIDE, POTASSIUM CHLORIDE, SODIUM LACTATE AND CALCIUM CHLORIDE: 600; 310; 30; 20 INJECTION, SOLUTION INTRAVENOUS at 13:46

## 2018-01-04 RX ADMIN — MORPHINE SULFATE 2 MG: 0.5 INJECTION, SOLUTION EPIDURAL; INTRATHECAL; INTRAVENOUS at 14:18

## 2018-01-04 RX ADMIN — LIDOCAINE HYDROCHLORIDE,EPINEPHRINE BITARTRATE 5 ML: 20; .005 INJECTION, SOLUTION EPIDURAL; INFILTRATION; INTRACAUDAL; PERINEURAL at 14:38

## 2018-01-04 RX ADMIN — CEFAZOLIN SODIUM 2 G: 2 INJECTION, SOLUTION INTRAVENOUS at 07:54

## 2018-01-04 RX ADMIN — OXYTOCIN 30 UNITS: 10 INJECTION, SOLUTION INTRAMUSCULAR; INTRAVENOUS at 14:15

## 2018-01-04 RX ADMIN — SODIUM CHLORIDE, POTASSIUM CHLORIDE, SODIUM LACTATE AND CALCIUM CHLORIDE 125 ML/HR: 600; 310; 30; 20 INJECTION, SOLUTION INTRAVENOUS at 15:52

## 2018-01-04 RX ADMIN — ACETAMINOPHEN 1000 MG: 500 TABLET ORAL at 12:28

## 2018-01-04 RX ADMIN — LIDOCAINE HYDROCHLORIDE,EPINEPHRINE BITARTRATE 5 ML: 20; .005 INJECTION, SOLUTION EPIDURAL; INFILTRATION; INTRACAUDAL; PERINEURAL at 13:30

## 2018-01-04 NOTE — PROGRESS NOTES
Pharmacokinetic Consult - Gentamidin Dosing  Steven Watt is a 37 y.o. female who has been consulted for gentamicin dosing for chorioamnionitis.    Relevant clinical data and objective history reviewed:  No results found for: CREATININE  CrCl cannot be calculated (No order found.).  I/O last 3 completed shifts:  In: 670.2 [I.V.:670.2]  Out: -   Patient weight: 98 kg (216 lb)   Ideal body weight: 50.1 kg    Asessment/Plan  Will initiate dose at 100 mg (2 mg/kg ideal body weight) IV once       followed by  Gentamicin 80mg (1.5 mg/kg ideal body weight) IV q 8 hours    Pharmacy will order gentamicin level in 3 days if patient remains on gentamicin therapy after that time frame.    Boris Cook [facility]  1/4/2018  6:45 AM

## 2018-01-04 NOTE — PROGRESS NOTES
Patient progressed to 5 cm, with increasing Pitocin fetus develops variable decelerations,    Mom now has a temperature to 101- 102, Ancef and gentamicin were started earlier for presumed chorioamnionitis.    Decelerations improved with Pitocin off    A/fetal intolerance to labor   Probable chorioamnionitis   Postdates with meconium-stained amniotic fluid    We will proceed with her  section.  Plan reviewed with patient and family.    Updated H&P,prenatal record on chart, no changes.

## 2018-01-04 NOTE — ANESTHESIA PROCEDURE NOTES
Labor Epidural    Patient location during procedure: OB  Performed By  Anesthesiologist: ANTONY RENNER  CRNA: JACKSON PAYNE  Preanesthetic Checklist  Completed: patient identified, surgical consent, pre-op evaluation, timeout performed, IV checked, risks and benefits discussed and monitors and equipment checked  Prep:  Pt Position:sitting  Sterile Tech:cap, gloves, mask and sterile barrier  Prep:DuraPrep  Monitoring:blood pressure monitoring  Epidural Block Procedure:  Approach:midline  Guidance:palpation technique  Location:L3-L4  Needle Type:Tuohy  Needle Gauge:17 G  Loss of Resistance Medium: saline  Cath Depth at skin:12 cm  Paresthesia: none  Aspiration:negative  Test Dose:negative  Number of Attempts: 2  Post Assessment:  Dressing:occlusive dressing applied and secured with tape  Pt Tolerance:patient tolerated the procedure well with no apparent complications  Complications:no

## 2018-01-04 NOTE — PLAN OF CARE
Problem: Labor (Cervical Ripen, Induct, Augment) (Adult,Obstetrics,Pediatric)  Goal: Signs and Symptoms of Listed Potential Problems Will be Absent or Manageable (Labor)  Outcome: Ongoing (interventions implemented as appropriate)

## 2018-01-04 NOTE — ANESTHESIA POSTPROCEDURE EVALUATION
Patient: Steven Watt    Procedure Summary     Date Anesthesia Start Anesthesia Stop Room / Location    18 0806 1507 [facility] LABOR DELIVERY       Procedure Diagnosis Surgeon Provider     SECTION PRIMARY (N/A Abdomen) No diagnosis on file. MD Duglas Carlton MD          Anesthesia Type: epidural, ITN  Last vitals  BP   120/74 (18 1506)   Temp   99.2 °F (37.3 °C) (18 1506)   Pulse   106 (18 1506)   Resp   16 (18 1506)     SpO2   96 % (18 1506)     Post Anesthesia Care and Evaluation    Patient location during evaluation: bedside  Patient participation: complete - patient participated  Level of consciousness: awake and alert  Pain score: 2  Pain management: adequate  Airway patency: patent  Anesthetic complications: No anesthetic complications    Cardiovascular status: acceptable  Respiratory status: acceptable  Hydration status: acceptable  Post Neuraxial Block status: No signs or symptoms of PDPH

## 2018-01-04 NOTE — H&P
HPI:  37-year-old female  at 40 weeks and 1 day, patient with NST in the office which showed possible early decelerations, it was hard to determine the exact baseline, it was decided that since she was overdue to proceed with induction    PMH:      Illness:  Abnormal Pap    Surgery:  No known surgeries    Allergies: No known drug allergies    Physical Exam:     Afeb VSS  Ht- RR  Lungs- Clear  Abd- soft nontender  Uterus- appropriate for gestational age    exam; 0-/-2-3    Chart, Office Records Reviewed:  Assessment:  Intrauterine pregnancy 40+ weeks, equivocal NST    Plan:  Dowling bulb induction tonight, Pitocin in a.m.    Cristopher Cho MD  18  8:12 PM

## 2018-01-04 NOTE — ANESTHESIA PREPROCEDURE EVALUATION
Anesthesia Evaluation     Patient summary reviewed and Nursing notes reviewed   no history of anesthetic complications:  NPO Solid Status: > 8 hours  NPO Liquid Status: > 8 hours     Airway   Mallampati: II  TM distance: >3 FB  Neck ROM: full  possible difficult intubation  Dental      Pulmonary    (+) a smoker Former,   Cardiovascular - negative cardio ROS        Neuro/Psych- negative ROS  GI/Hepatic/Renal/Endo    (+) obesity,      Musculoskeletal (-) negative ROS    Abdominal    Substance History - negative use     OB/GYN    (+) Pregnant,         Other - negative ROS                                             Anesthesia Plan    ASA 3     epidural     Anesthetic plan and risks discussed with patient.

## 2018-01-04 NOTE — PROCEDURES
37 y.o.  OB History      Para Term  AB Living    1 0 0 0 0 0    SAB TAB Ectopic Multiple Live Births    0 0 0 0        Presents as an induction of labour  Her primary OB requests a Dowling Bulb placement to initiate the induction of labour.    Fetal Heart Rate Assessment   Method: Fetal HR Assessment Method: external   Beats/min: Fetal HR (Beats/Min): 140   Baseline: Fetal HR Baseline: normal range (110-160 bpm)   Varibility: Fetal HR Variability: moderate (amplitude range 6 to 25 bpm)   Accels: Fetal HR Accelerations: greater than/equal to 15 bpm, lasting at least 15 seconds   Decels: Fetal HR Decelerations: absent   Tracing Category:       TOCO:  Irregular  SVE:  FT/70/-2    A Dowling Bulb was placed without difficulties with 60 cc of sterile saline.  The patient tolerated the procedure well.

## 2018-01-04 NOTE — OP NOTE
Operative Note    Patient name: Steven Watt  YOB: 1980   MRN: [id number]  Admission Date: 1/3/2018  Referring Provider: Cristopher Cho MD    ID: 37 y.o.  at 40w2d    Preoperative Diagnosis:   Patient Active Problem List   Diagnosis   • AMA (advanced maternal age) primigravida 35+   • False labor after 37 weeks of gestation without delivery   • Post-dates pregnancy   - Fetal intolerance to labor  -maternal fever chorioamnionitis    Postoperative Diagnosis: Same as above.    Procedure(s): primarylow transverse  delivery     Surgeons: Surgeon(s) and Role:     * Cristopher Cho MD - Primary     * Huy Anguiano MD - Resident - Assisting    Anesthesia: Epidural    Estimated Blood Loss: 1300 mL mL    IV Fluids: [unfilled]    Preoperative antibiotic: Ancef (cefazolin) 2 grams and Gentamycin 120 mg    Blood products:   Blood Administration Record     None          Pathology:   Order Name Source Comment Collection Info Order Time   BLOOD GAS, ARTERIAL, CORD Umbilical Cord  Collected By: Elizabeth Bates RN 2018  2:21 PM   BLOOD GAS, VENOUS, CORD Umbilical Cord  Collected By: Elizabeth Bates RN 2018  2:21 PM   TISSUE PATHOLOGY EXAM Placenta   2018  2:26 PM       Drains: Dowling catheter to gravity    Complications: None    Condition: Stable to recovery room                                          Infant:                 Gender: female  infant    Weight: 3577 g (7 lb 14.2 oz)     Apgars: 8   @ 1 minute /     9   @ 5 minutes    Cord gases: Venous:  @BABYNOHDR(BRIEFLAB, PHCVEN, BECVEN)@     Arterial:  @BABYNOHDR(BRIEFLAB, PHCART, BECART)@         Operative Summary:   After obtaining informed consent the patient was taken to the operating room where adequate anesthesia was obtained.  Dowling catheter was placed in the bladder preoperatively.  IV antibiotics were given preoperatively.       The abdomen was prepped and draped in the usual sterile fashion for  delivery.   After confirming adequate anesthesia a Pfannenstiel skin incision was made with the scalpel and carried through to the underlying layer of fascia.  The fascia was incised in the midline and the incision extended laterally with the Larson scissors and with blunt dissection.       The upper aspect of the fascia was grasped with 2 Kocher clamps, elevated, and dissected off the underlying rectus muscles bluntly and with the Larson scissors.  The Kocher clamps were removed and applied to the inferior aspect of the fascia.  The fascia was dissected off of the rectus muscles in the same fashion.  The peritoneum was entered bluntly.  The incision was stretched and the bladder blade and Silveira retractor inserted for visualization of the uterus.      The uterus was incised with the scalpel in a low transverse fashion.  The uterine incision was entered digitally and the incision extended bluntly in a cranial-caudal fashion.  Retractors were removed and membranes were ruptured.  The infant was delivered atraumatically from cephalic presentation.  The umbilical cord was clamped and cut and the nose and mouth bulb suctioned.  The infant was handed off to waiting pediatric staff.      Cord blood gases were collected from a clamped segment of umbilical cord.  Cord blood was collected.  The placenta was removed using cord traction and uterine massage.  The uterus was exteriorized and cleared of all clots and debris.  The uterine incision was repaired with 1-0 Chromic in a running locked fashion. A single-layer technique was used.  Additional hemostatic measures required: figure-of-eight sutures.    The incision was inspected and excellent hemostasis was noted.  The tubes and ovaries were noted to be normal. The uterus was returned to the abdomen.  The gutters were cleared of all clots and debris.  Irrigation was used.  The uterine incision was again inspected and found to be hemostatic.      The peritoneum was reapproximated with  2.0 Vicryl .  The fascia was closed with 0 Vicryl  in a running fashion (two segments).  The subcutaneous space was reapproximated using 3.0 Plain.      The skin was closed using staples.  The patient was transferred to the recovery room in stable condition.    Cristopher Cho MD  1/4/2018  3:08 PM

## 2018-01-05 LAB
BASOPHILS # BLD AUTO: 0.01 10*3/MM3 (ref 0–0.2)
BASOPHILS NFR BLD AUTO: 0.1 % (ref 0–1)
DEPRECATED RDW RBC AUTO: 47.2 FL (ref 37–54)
EOSINOPHIL # BLD AUTO: 0.08 10*3/MM3 (ref 0–0.3)
EOSINOPHIL NFR BLD AUTO: 0.5 % (ref 0–3)
ERYTHROCYTE [DISTWIDTH] IN BLOOD BY AUTOMATED COUNT: 13.4 % (ref 11.3–14.5)
HCT VFR BLD AUTO: 25.1 % (ref 34.5–44)
HGB BLD-MCNC: 8.2 G/DL (ref 11.5–15.5)
IMM GRANULOCYTES # BLD: 0.04 10*3/MM3 (ref 0–0.03)
IMM GRANULOCYTES NFR BLD: 0.2 % (ref 0–0.6)
LYMPHOCYTES # BLD AUTO: 2.01 10*3/MM3 (ref 0.6–4.8)
LYMPHOCYTES NFR BLD AUTO: 11.6 % (ref 24–44)
MCH RBC QN AUTO: 31.5 PG (ref 27–31)
MCHC RBC AUTO-ENTMCNC: 32.7 G/DL (ref 32–36)
MCV RBC AUTO: 96.5 FL (ref 80–99)
MONOCYTES # BLD AUTO: 1.29 10*3/MM3 (ref 0–1)
MONOCYTES NFR BLD AUTO: 7.4 % (ref 0–12)
NEUTROPHILS # BLD AUTO: 13.9 10*3/MM3 (ref 1.5–8.3)
NEUTROPHILS NFR BLD AUTO: 80.2 % (ref 41–71)
PLATELET # BLD AUTO: 250 10*3/MM3 (ref 150–450)
PMV BLD AUTO: 9.3 FL (ref 6–12)
RBC # BLD AUTO: 2.6 10*6/MM3 (ref 3.89–5.14)
WBC NRBC COR # BLD: 17.33 10*3/MM3 (ref 3.5–10.8)

## 2018-01-05 PROCEDURE — 25010000002 GENTAMICIN PER 80 MG

## 2018-01-05 PROCEDURE — 25010000003 CEFAZOLIN IN DEXTROSE 2-4 GM/100ML-% SOLUTION: Performed by: OBSTETRICS & GYNECOLOGY

## 2018-01-05 PROCEDURE — 85025 COMPLETE CBC W/AUTO DIFF WBC: CPT | Performed by: OBSTETRICS & GYNECOLOGY

## 2018-01-05 RX ORDER — PRENATAL VIT/IRON FUM/FOLIC AC 27MG-0.8MG
1 TABLET ORAL DAILY
Status: DISCONTINUED | OUTPATIENT
Start: 2018-01-05 | End: 2018-01-08 | Stop reason: HOSPADM

## 2018-01-05 RX ORDER — ONDANSETRON 2 MG/ML
4 INJECTION INTRAMUSCULAR; INTRAVENOUS ONCE
Status: DISCONTINUED | OUTPATIENT
Start: 2018-01-05 | End: 2018-01-08 | Stop reason: HOSPADM

## 2018-01-05 RX ORDER — OXYTOCIN/RINGER'S LACTATE 20/1000 ML
999 PLASTIC BAG, INJECTION (ML) INTRAVENOUS ONCE
Status: DISCONTINUED | OUTPATIENT
Start: 2018-01-05 | End: 2018-01-08 | Stop reason: HOSPADM

## 2018-01-05 RX ORDER — METOCLOPRAMIDE HYDROCHLORIDE 5 MG/ML
10 INJECTION INTRAMUSCULAR; INTRAVENOUS ONCE AS NEEDED
Status: DISCONTINUED | OUTPATIENT
Start: 2018-01-05 | End: 2018-01-08 | Stop reason: HOSPADM

## 2018-01-05 RX ORDER — FERROUS SULFATE 325(65) MG
325 TABLET ORAL 2 TIMES DAILY WITH MEALS
Status: DISCONTINUED | OUTPATIENT
Start: 2018-01-05 | End: 2018-01-08 | Stop reason: HOSPADM

## 2018-01-05 RX ORDER — ACETAMINOPHEN 325 MG/1
650 TABLET ORAL ONCE AS NEEDED
Status: DISCONTINUED | OUTPATIENT
Start: 2018-01-05 | End: 2018-01-08 | Stop reason: HOSPADM

## 2018-01-05 RX ORDER — NALOXONE HCL 0.4 MG/ML
0.4 VIAL (ML) INJECTION
Status: ACTIVE | OUTPATIENT
Start: 2018-01-05 | End: 2018-01-06

## 2018-01-05 RX ORDER — IBUPROFEN 600 MG/1
600 TABLET ORAL ONCE AS NEEDED
Status: DISCONTINUED | OUTPATIENT
Start: 2018-01-05 | End: 2018-01-08 | Stop reason: HOSPADM

## 2018-01-05 RX ORDER — OXYTOCIN/RINGER'S LACTATE 20/1000 ML
125 PLASTIC BAG, INJECTION (ML) INTRAVENOUS CONTINUOUS PRN
Status: DISPENSED | OUTPATIENT
Start: 2018-01-05 | End: 2018-01-06

## 2018-01-05 RX ORDER — HYDROMORPHONE HYDROCHLORIDE 1 MG/ML
0.5 INJECTION, SOLUTION INTRAMUSCULAR; INTRAVENOUS; SUBCUTANEOUS
Status: ACTIVE | OUTPATIENT
Start: 2018-01-05 | End: 2018-01-06

## 2018-01-05 RX ADMIN — CEFAZOLIN SODIUM 2 G: 2 INJECTION, SOLUTION INTRAVENOUS at 13:26

## 2018-01-05 RX ADMIN — CEFAZOLIN SODIUM 2 G: 2 INJECTION, SOLUTION INTRAVENOUS at 21:38

## 2018-01-05 RX ADMIN — Medication 325 MG: at 18:21

## 2018-01-05 RX ADMIN — GENTAMICIN SULFATE 100 MG: 100 INJECTION, SOLUTION INTRAVENOUS at 08:36

## 2018-01-05 RX ADMIN — SODIUM CHLORIDE, POTASSIUM CHLORIDE, SODIUM LACTATE AND CALCIUM CHLORIDE 125 ML/HR: 600; 310; 30; 20 INJECTION, SOLUTION INTRAVENOUS at 01:14

## 2018-01-05 RX ADMIN — GENTAMICIN SULFATE 100 MG: 100 INJECTION, SOLUTION INTRAVENOUS at 16:29

## 2018-01-05 RX ADMIN — SIMETHICONE CHEW TAB 80 MG 80 MG: 80 TABLET ORAL at 12:34

## 2018-01-05 RX ADMIN — SIMETHICONE CHEW TAB 80 MG 80 MG: 80 TABLET ORAL at 18:21

## 2018-01-05 RX ADMIN — SIMETHICONE CHEW TAB 80 MG 80 MG: 80 TABLET ORAL at 08:35

## 2018-01-05 RX ADMIN — DOCUSATE SODIUM 100 MG: 100 CAPSULE, LIQUID FILLED ORAL at 21:38

## 2018-01-05 RX ADMIN — IBUPROFEN 600 MG: 600 TABLET, FILM COATED ORAL at 21:39

## 2018-01-05 RX ADMIN — SIMETHICONE CHEW TAB 80 MG 80 MG: 80 TABLET ORAL at 21:39

## 2018-01-05 RX ADMIN — PRENATAL VIT W/ FE FUMARATE-FA TAB 27-0.8 MG 1 TABLET: 27-0.8 TAB at 12:33

## 2018-01-05 RX ADMIN — Medication 325 MG: at 12:32

## 2018-01-05 RX ADMIN — CEFAZOLIN SODIUM 2 G: 2 INJECTION, SOLUTION INTRAVENOUS at 05:45

## 2018-01-05 RX ADMIN — DOCUSATE SODIUM 100 MG: 100 CAPSULE, LIQUID FILLED ORAL at 08:35

## 2018-01-05 RX ADMIN — IBUPROFEN 600 MG: 600 TABLET, FILM COATED ORAL at 08:35

## 2018-01-05 NOTE — PROGRESS NOTES
CHLOE Eloy COTE Watt  : 1980  MRN: [id number]  CSN: [id number]    Post-operative Day #1  Subjective   Her pain is well controlled. Vaginal bleeding is normal in amount. She is ambulating without difficulty. She is passing gas. Her baby is in the NICU, with oxygen and FT. Denies fever.      Objective     Min/max vitals past 24 hours:   Temp  Min: 97.6 °F (36.4 °C)  Max: 103.1 °F (39.5 °C)  BP  Min: 78/56  Max: 149/79  Pulse  Min: 83  Max: 144  Resp  Min: 16  Max: 20        General: well developed; well nourished  no acute distress   Abdomen: soft, non-tender; no masses  incision is covered by bandage, clean, dry, intact and without drainage  fundus firm and non-tender   Pelvic: Not performed   Ext: Calves NT       Results from last 7 days  Lab Units 18  1347   WBC 10*3/mm3 6.50   HEMOGLOBIN g/dL 11.2*   HEMATOCRIT % 34.4*   PLATELETS 10*3/mm3 303     Lab Results   Component Value Date    RH Positive 2018    HEPBSAG Negative 2017        Assessment   1. POD #1 S/P Primary  (LTCS) for chorio and fetal tachy  2. Doing well   3. Choriomanionitis     Plan   1. Continue routine post-operative care  2. Ambulate  3. Advance diet   4. Continue antibiotics for 36hrs PP.     Huy Anguiano MD  2018  7:42 AM

## 2018-01-05 NOTE — PLAN OF CARE
Problem: Patient Care Overview (Adult)  Goal: Plan of Care Review  Outcome: Ongoing (interventions implemented as appropriate)   18 1241   Patient Care Overview   Progress improving   Coping/Psychosocial Response Interventions   Plan Of Care Reviewed With patient   Outcome Evaluation   Outcome Summary/Follow up Plan ambulating in room VSS tolerating reg diet     Goal: Adult Individualization and Mutuality  Outcome: Ongoing (interventions implemented as appropriate)    Goal: Discharge Needs Assessment  Outcome: Ongoing (interventions implemented as appropriate)      Problem: Postpartum ( Delivery) (Adult)  Goal: Signs and Symptoms of Listed Potential Problems Will be Absent or Manageable (Postpartum)  Outcome: Ongoing (interventions implemented as appropriate)

## 2018-01-05 NOTE — ANESTHESIA POSTPROCEDURE EVALUATION
Patient: Steven Watt    Procedure Summary     Date Anesthesia Start Anesthesia Stop Room / Location    18 0806 1507 [facility] LABOR DELIVERY       Procedure Diagnosis Surgeon Provider     SECTION PRIMARY (N/A Abdomen) No diagnosis on file. MD Duglas Carlton MD          Anesthesia Type: epidural, ITN  Last vitals  BP   107/57 (18)   Temp   98 °F (36.7 °C) (18)   Pulse   83 (18)   Resp   16 (18)     SpO2   97 % (18 1615)     Post Anesthesia Care and Evaluation    Patient location during evaluation: bedside  Patient participation: complete - patient participated  Level of consciousness: awake and alert  Pain management: adequate  Airway patency: patent  Anesthetic complications: No anesthetic complications    Cardiovascular status: acceptable  Respiratory status: acceptable  Hydration status: acceptable  Post Neuraxial Block status: Motor and sensory function returned to baseline and No signs or symptoms of PDPH

## 2018-01-05 NOTE — PROGRESS NOTES
2018    Name:Steven Watt    MR#:[id number]     PROGRESS NOTE:  Post-Op 1 S/P    HD:2    Subjective   37 y.o. yo Female  s/p CS at 40w3d doing well. Pain well controlled. Tolerating regular diet and having flatus. Lochia normal.     Patient Active Problem List   Diagnosis   • AMA (advanced maternal age) primigravida 35+   • False labor after 37 weeks of gestation without delivery   • Post-dates pregnancy        Objective    Vitals  Temp:  Temp:  [97.6 °F (36.4 °C)-103.1 °F (39.5 °C)] 98 °F (36.7 °C)  Temp src: Oral  BP:  BP: ()/(44-79) 107/57  Pulse:  Heart Rate:  [] 83  RR:   Resp:  [16-20] 16    General Awake, alert, no distress  Abdomen Soft, non-distended, fundus firm, below umbilicus, appropriately tender  Incision  drsg Intact, no erythema or exudate  Extremities Calves NT bilaterally     I/O last 3 completed shifts:  In: 3383 [I.V.:3122.6; IV Piggyback:200]  Out: 4325 [Urine:3025; Blood:1300]    LABS:   Lab Results   Component Value Date    WBC 17.33 (H) 2018    HGB 8.2 (L) 2018    HCT 25.1 (L) 2018    MCV 96.5 2018     2018       Infant: female , in NICU      Assessment   1.  POD 1   2. Chorio- continue abx x 36 hrs (per resident MD)   3. Anemia due to blood loss- start PNV and iron    Plan: Doing well.  Routine postoperative care. Advance      Active Problems:   None      Deborah Castellano, APRN  2018 8:54 AM

## 2018-01-06 PROCEDURE — 25010000002 GENTAMICIN PER 80 MG

## 2018-01-06 PROCEDURE — 25010000003 CEFAZOLIN IN DEXTROSE 2-4 GM/100ML-% SOLUTION: Performed by: OBSTETRICS & GYNECOLOGY

## 2018-01-06 RX ADMIN — DOCUSATE SODIUM 100 MG: 100 CAPSULE, LIQUID FILLED ORAL at 08:06

## 2018-01-06 RX ADMIN — Medication 325 MG: at 08:06

## 2018-01-06 RX ADMIN — OXYCODONE AND ACETAMINOPHEN 1 TABLET: 5; 325 TABLET ORAL at 17:32

## 2018-01-06 RX ADMIN — Medication 325 MG: at 17:33

## 2018-01-06 RX ADMIN — GENTAMICIN SULFATE 100 MG: 100 INJECTION, SOLUTION INTRAVENOUS at 01:36

## 2018-01-06 RX ADMIN — OXYCODONE AND ACETAMINOPHEN 1 TABLET: 5; 325 TABLET ORAL at 12:25

## 2018-01-06 RX ADMIN — DOCUSATE SODIUM 100 MG: 100 CAPSULE, LIQUID FILLED ORAL at 17:33

## 2018-01-06 RX ADMIN — SIMETHICONE CHEW TAB 80 MG 80 MG: 80 TABLET ORAL at 17:33

## 2018-01-06 RX ADMIN — IBUPROFEN 600 MG: 600 TABLET, FILM COATED ORAL at 17:32

## 2018-01-06 RX ADMIN — IBUPROFEN 600 MG: 600 TABLET, FILM COATED ORAL at 09:16

## 2018-01-06 RX ADMIN — CEFAZOLIN SODIUM 2 G: 2 INJECTION, SOLUTION INTRAVENOUS at 08:06

## 2018-01-06 RX ADMIN — GENTAMICIN SULFATE 100 MG: 100 INJECTION, SOLUTION INTRAVENOUS at 08:55

## 2018-01-06 RX ADMIN — SIMETHICONE CHEW TAB 80 MG 80 MG: 80 TABLET ORAL at 21:29

## 2018-01-06 RX ADMIN — SIMETHICONE CHEW TAB 80 MG 80 MG: 80 TABLET ORAL at 08:06

## 2018-01-06 RX ADMIN — PRENATAL VIT W/ FE FUMARATE-FA TAB 27-0.8 MG 1 TABLET: 27-0.8 TAB at 09:17

## 2018-01-06 RX ADMIN — IBUPROFEN 600 MG: 600 TABLET, FILM COATED ORAL at 03:26

## 2018-01-06 NOTE — PROGRESS NOTES
2018    Name:Steven Watt    MR#:[id number]     PROGRESS NOTE:  Post-Op 2 S/P    HD:3    Subjective   37 y.o. yo Female  s/p CS at 40w4d doing well. Pain well controlled. Tolerating regular diet and having flatus. Lochia normal.     Patient Active Problem List   Diagnosis   • AMA (advanced maternal age) primigravida 35+   • False labor after 37 weeks of gestation without delivery   • Post-dates pregnancy        Objective    Vitals  Temp:  Temp:  [97.6 °F (36.4 °C)-99.2 °F (37.3 °C)] 98.5 °F (36.9 °C)  Temp src: Oral  BP:  BP: (100-128)/(55-61) 102/55  Pulse:  Heart Rate:  [] 84  RR:   Resp:  [16-20] 16    General Awake, alert, no distress  Abdomen Soft, non-distended, fundus firm, below umbilicus, appropriately tender  Incision  Intact, no erythema or exudate  Extremities Calves NT bilaterally     I/O last 3 completed shifts:  In: 100 [IV Piggyback:100]  Out: 5150 [Urine:5150]    LABS:   Lab Results   Component Value Date    WBC 17.33 (H) 2018    HGB 8.2 (L) 2018    HCT 25.1 (L) 2018    MCV 96.5 2018     2018       Infant: female       Assessment   1.  POD 2    Plan: Doing well.  Routine postoperative care      Active Problems:   None      Margie Hernandez, MANN  2018 9:30 AM

## 2018-01-07 RX ADMIN — PRENATAL VIT W/ FE FUMARATE-FA TAB 27-0.8 MG 1 TABLET: 27-0.8 TAB at 09:33

## 2018-01-07 RX ADMIN — OXYCODONE AND ACETAMINOPHEN 1 TABLET: 5; 325 TABLET ORAL at 21:59

## 2018-01-07 RX ADMIN — IBUPROFEN 600 MG: 600 TABLET, FILM COATED ORAL at 16:04

## 2018-01-07 RX ADMIN — IBUPROFEN 600 MG: 600 TABLET, FILM COATED ORAL at 09:33

## 2018-01-07 RX ADMIN — Medication 325 MG: at 21:58

## 2018-01-07 RX ADMIN — SIMETHICONE CHEW TAB 80 MG 80 MG: 80 TABLET ORAL at 21:58

## 2018-01-07 RX ADMIN — Medication 325 MG: at 09:33

## 2018-01-07 RX ADMIN — IBUPROFEN 600 MG: 600 TABLET, FILM COATED ORAL at 21:59

## 2018-01-07 RX ADMIN — OXYCODONE AND ACETAMINOPHEN 1 TABLET: 5; 325 TABLET ORAL at 03:22

## 2018-01-07 RX ADMIN — DOCUSATE SODIUM 100 MG: 100 CAPSULE, LIQUID FILLED ORAL at 09:33

## 2018-01-07 RX ADMIN — SIMETHICONE CHEW TAB 80 MG 80 MG: 80 TABLET ORAL at 09:32

## 2018-01-07 RX ADMIN — IBUPROFEN 600 MG: 600 TABLET, FILM COATED ORAL at 03:22

## 2018-01-07 RX ADMIN — OXYCODONE AND ACETAMINOPHEN 1 TABLET: 5; 325 TABLET ORAL at 00:07

## 2018-01-07 NOTE — PLAN OF CARE
Problem: Patient Care Overview (Adult)  Goal: Plan of Care Review  Outcome: Ongoing (interventions implemented as appropriate)   18 0510   Patient Care Overview   Progress improving   Coping/Psychosocial Response Interventions   Plan Of Care Reviewed With patient     Goal: Adult Individualization and Mutuality  Outcome: Ongoing (interventions implemented as appropriate)      Problem: Postpartum ( Delivery) (Adult)  Goal: Signs and Symptoms of Listed Potential Problems Will be Absent or Manageable (Postpartum)  Outcome: Ongoing (interventions implemented as appropriate)   18 0510   Postpartum ( Delivery)   Problems Assessed (Postpartum ) all   Problems Present (Postpartum ) none

## 2018-01-07 NOTE — PROGRESS NOTES
2018    Name:Steven Watt    MR#:[id number]     PROGRESS NOTE:  Post-Op 3 S/P    HD:4    Subjective   37 y.o. yo Female  s/p CS at 40w5d doing well. Pain well controlled. Tolerating regular diet and having flatus. Lochia normal.     Patient Active Problem List   Diagnosis   • AMA (advanced maternal age) primigravida 35+   • False labor after 37 weeks of gestation without delivery   • Post-dates pregnancy        Objective    Vitals  Temp:  Temp:  [98.5 °F (36.9 °C)-98.9 °F (37.2 °C)] 98.6 °F (37 °C)  Temp src: Oral  BP:  BP: (102-120)/(55-75) 115/69  Pulse:  Heart Rate:  [80-90] 87  RR:   Resp:  [16] 16    General Awake, alert, no distress  Abdomen Soft, non-distended, fundus firm, below umbilicus, appropriately tender  Incision  Intact, no erythema or exudate  Extremities Calves NT bilaterally     I/O last 3 completed shifts:  In: -   Out: 3000 [Urine:3000]    LABS:   Lab Results   Component Value Date    WBC 17.33 (H) 2018    HGB 8.2 (L) 2018    HCT 25.1 (L) 2018    MCV 96.5 2018     2018       Infant: female       Assessment   1.  POD 3. Pt would like another day to establish feeding patterns and pain control    Plan: Doing well.  Routine postoperative care      Active Problems:   None      Evonne Carrasco MD  2018 5:55 AM

## 2018-01-07 NOTE — PLAN OF CARE
Problem: Patient Care Overview (Adult)  Goal: Plan of Care Review  Outcome: Ongoing (interventions implemented as appropriate)   18 5135   Patient Care Overview   Progress improving   Coping/Psychosocial Response Interventions   Plan Of Care Reviewed With patient   Outcome Evaluation   Outcome Summary/Follow up Plan vss ambulating in gaines     Goal: Adult Individualization and Mutuality  Outcome: Ongoing (interventions implemented as appropriate)    Goal: Discharge Needs Assessment  Outcome: Ongoing (interventions implemented as appropriate)      Problem: Postpartum ( Delivery) (Adult)  Goal: Signs and Symptoms of Listed Potential Problems Will be Absent or Manageable (Postpartum)  Outcome: Ongoing (interventions implemented as appropriate)

## 2018-01-08 VITALS
HEART RATE: 83 BPM | SYSTOLIC BLOOD PRESSURE: 122 MMHG | BODY MASS INDEX: 39.75 KG/M2 | TEMPERATURE: 98.1 F | OXYGEN SATURATION: 97 % | WEIGHT: 216 LBS | RESPIRATION RATE: 16 BRPM | HEIGHT: 62 IN | DIASTOLIC BLOOD PRESSURE: 70 MMHG

## 2018-01-08 RX ORDER — OXYCODONE HYDROCHLORIDE AND ACETAMINOPHEN 5; 325 MG/1; MG/1
1-2 TABLET ORAL EVERY 4 HOURS PRN
Qty: 30 TABLET | Refills: 0 | Status: SHIPPED | OUTPATIENT
Start: 2018-01-08 | End: 2018-01-11

## 2018-01-08 RX ORDER — IBUPROFEN 600 MG/1
600 TABLET ORAL EVERY 6 HOURS PRN
Qty: 30 TABLET | Refills: 0 | Status: SHIPPED | OUTPATIENT
Start: 2018-01-08 | End: 2020-10-28

## 2018-01-08 RX ADMIN — DOCUSATE SODIUM 100 MG: 100 CAPSULE, LIQUID FILLED ORAL at 07:38

## 2018-01-08 RX ADMIN — IBUPROFEN 600 MG: 600 TABLET, FILM COATED ORAL at 05:49

## 2018-01-08 RX ADMIN — PRENATAL VIT W/ FE FUMARATE-FA TAB 27-0.8 MG 1 TABLET: 27-0.8 TAB at 07:38

## 2018-01-08 RX ADMIN — SIMETHICONE CHEW TAB 80 MG 80 MG: 80 TABLET ORAL at 07:38

## 2018-01-08 RX ADMIN — Medication 325 MG: at 07:38

## 2018-01-08 NOTE — DISCHARGE SUMMARY
Discharge Summary    Date of Admission: 1/3/2018  Date of Discharge:  2018      Patient: Steven Watt      MR#:[id number]    Primary Surgeon/OB: Cristopher Cho MD    Discharge Surgeon/OB:    Presenting Problem/History of Present Illness  Post-dates pregnancy [O48.0]     Patient Active Problem List   Diagnosis   • AMA (advanced maternal age) primigravida 35+   • False labor after 37 weeks of gestation without delivery   • Post-dates pregnancy         Discharge Diagnosis:  section at 40w6d    Procedures:  , Low Transverse     2018    2:14 PM        Rh Immune globulin given: no    Rubella vaccine given: no    Discharge Date: 2018; Discharge Time: 9:48 AM    Early Discharge:  NO    Hospital Course  Patient is a 37 y.o. female  at 40w6d status post  section with uneventful postoperative recovery.  Patient was advanced to regular diet on postoperative day#1.  On discharge, ambulating, tolerating a regular diet without any difficulties and her incision is dry, clean and intact.     Infant:   female  fetus 3577 g (7 lb 14.2 oz)  with Apgar scores of 8  , 9   at five minutes.    Condition on Discharge:  Stable    Vital Signs  Temp:  [98 °F (36.7 °C)-98.1 °F (36.7 °C)] 98.1 °F (36.7 °C)  Heart Rate:  [83-94] 83  Resp:  [16-18] 16  BP: (122-126)/(61-82) 122/70    Lab Results   Component Value Date    WBC 17.33 (H) 2018    HGB 8.2 (L) 2018    HCT 25.1 (L) 2018    MCV 96.5 2018     2018       Discharge Disposition  Home or Self Care    Discharge Medications   Steven Watt Medication Instructions VINCENZO:[id number]    Printed on:18 0948   Medication Information                      ibuprofen (ADVIL,MOTRIN) 600 MG tablet  Take 1 tablet by mouth Every 6 (Six) Hours As Needed for Mild Pain .             oxyCODONE-acetaminophen (PERCOCET) 5-325 MG per tablet  Take 1-2 tablets by mouth Every 4 (Four) Hours As Needed for Moderate Pain   for up to 3 days.             Prenatal Vit-Fe Fumarate-FA (PRENATAL 27-1) 27-1 MG tablet tablet  Take  by mouth Daily.             FERROUS SULFATE   325MG  1 TABLET PO BID    (Pt. Will continue to take Fe at home).    Discharge Diet:     Activity at Discharge:   Activity Instructions     Discharge Activity Restrictions       1) No driving for 2 weeks and no longer taking narcotics.   2) Return to school / work in 6 to 8 weeks}.  3) May shower.  4) Do not lift / push / pull more then 15 lbs.             Baby in NICU with possible plan for discharge tomorrow per patient.     Follow-up Appointments  No future appointments.  Additional Instructions for the Follow-ups that You Need to Schedule     Call MD With Problems / Concerns    As directed        Discharge Follow-up with Specified Provider: Appointment with NP for staple removal on Thursday 1/11/18    As directed    To:  Appointment with NP for staple removal on Thursday 1/11/18           Discharge Follow-up with Specified Provider: Appointment with NP on Thursday 1/11/18 for staple removal    As directed    To:  Appointment with NP on Thursday 1/11/18 for staple removal                     Marcella Wong, MANN  01/08/18  9:48 AM  Csd

## 2018-01-08 NOTE — PROGRESS NOTES
CHLOE Eloy COTE [place]  : 1980  MRN: [id number]  CSN: [id number]    Post-operative Day #4  Subjective   Her pain is well controlled. Vaginal bleeding is normal in amount. She is ambulating without difficulty. She is passing gas. She is voiding without difficulty. Her baby is doing well.     Objective     Min/max vitals past 24 hours:   Temp  Min: 98 °F (36.7 °C)  Max: 98.2 °F (36.8 °C)  BP  Min: 115/68  Max: 126/61  Pulse  Min: 87  Max: 94  Resp  Min: 16  Max: 18        General: well developed; well nourished  no acute distress   Abdomen: soft, non-tender; no masses  incision is clean, dry, intact and with staples  fundus firm and non-tender   Pelvic: Not performed   Ext: Calves NT       Results from last 7 days  Lab Units 18  0627 18  1347   WBC 10*3/mm3 17.33* 6.50   HEMOGLOBIN g/dL 8.2* 11.2*   HEMATOCRIT % 25.1* 34.4*   PLATELETS 10*3/mm3 250 303     Lab Results   Component Value Date    RH Positive 2018    HEPBSAG Negative 2017        Assessment   1. POD #4 S/P Primary  (LTCS)  2. Doing well     Plan   1. Continue routine post-operative care  2. Remove staples and place steristrips  3. Discharge to home    Huy Anguiano MD  2018  7:09 AM

## 2018-01-08 NOTE — PLAN OF CARE
Problem: Patient Care Overview (Adult)  Goal: Plan of Care Review  Outcome: Outcome(s) achieved Date Met: 18 0712   Patient Care Overview   Progress improving   Coping/Psychosocial Response Interventions   Plan Of Care Reviewed With patient     Goal: Adult Individualization and Mutuality  Outcome: Outcome(s) achieved Date Met: 18    Goal: Discharge Needs Assessment  Outcome: Outcome(s) achieved Date Met: 18      Problem: Labor (Cervical Ripen, Induct, Augment) (Adult,Obstetrics,Pediatric)  Goal: Signs and Symptoms of Listed Potential Problems Will be Absent or Manageable (Labor)  Outcome: Outcome(s) achieved Date Met: 18      Problem: Postpartum ( Delivery) (Adult)  Goal: Signs and Symptoms of Listed Potential Problems Will be Absent or Manageable (Postpartum)  Outcome: Outcome(s) achieved Date Met: 18

## 2018-01-09 LAB
CYTO UR: NORMAL
LAB AP CASE REPORT: NORMAL
LAB AP CLINICAL INFORMATION: NORMAL
Lab: NORMAL
PATH REPORT.FINAL DX SPEC: NORMAL
PATH REPORT.GROSS SPEC: NORMAL

## 2020-10-28 ENCOUNTER — INITIAL PRENATAL (OUTPATIENT)
Dept: OBSTETRICS AND GYNECOLOGY | Facility: CLINIC | Age: 40
End: 2020-10-28

## 2020-10-28 VITALS — WEIGHT: 184.4 LBS | BODY MASS INDEX: 33.73 KG/M2 | DIASTOLIC BLOOD PRESSURE: 66 MMHG | SYSTOLIC BLOOD PRESSURE: 104 MMHG

## 2020-10-28 DIAGNOSIS — O09.512 PRIMIGRAVIDA OF ADVANCED MATERNAL AGE IN SECOND TRIMESTER: ICD-10-CM

## 2020-10-28 DIAGNOSIS — Z3A.19 19 WEEKS GESTATION OF PREGNANCY: Primary | ICD-10-CM

## 2020-10-28 DIAGNOSIS — O09.30 LATE PRENATAL CARE: ICD-10-CM

## 2020-10-28 PROCEDURE — 0501F PRENATAL FLOW SHEET: CPT | Performed by: OBSTETRICS & GYNECOLOGY

## 2020-10-28 NOTE — PROGRESS NOTES
Initial ob visit     CC- Here for care of pregnancy        Steven Watt is a 40 y.o. female, , who presents for her first obstetrical visit.  Her last LMP was No LMP recorded. Patient is pregnant..    OB History    Para Term  AB Living   2 0 0 0 0 0   SAB TAB Ectopic Molar Multiple Live Births   0 0 0   0        # Outcome Date GA Lbr Pk/2nd Weight Sex Delivery Anes PTL Lv   2 Current            1                 Initial positive test date : 10/14/2020 , UPT          Prior obstetric issues, potential pregnancy concerns: no  Family history of genetic issues (includes FOB): no  Prior infections concerning in pregnancy (Rash, fever in last 2 weeks): no  Varicella Hx - yes  Prior testing for Cystic Fibrosis Carrier or Sickle Cell Trait- FOB has sickle cell trait  Prepregnancy BMI - Body mass index is 33.73 kg/m².  History of STD: no    Additional Pertinent History   Last Pap : > years ago -   Last Completed Pap Smear       Status Date      PAP SMEAR No completions recorded        History of abnormal Pap smear: yes - years ago  Family history of uterine, colon, breast, or ovarian cancer: no  Performs monthly Self-Breast Exam: yes  Feelings of Anxiety or Depression: no  Tobacco Usage?: No   Alcohol/Drug Use?: NO  Over the age of 35 at delivery: yes  Desires Genetic Screening: None  Flu Status: Will give in office today    PMH  Past Medical History:   Diagnosis Date   • Abnormal Pap smear of cervix        Current Outpatient Medications:   •  Prenatal Vit-Fe Fumarate-FA (PRENATAL ) 27-1 MG tablet tablet, Take  by mouth Daily., Disp: , Rfl:     The additional following portions of the patient's history were reviewed and updated as appropriate: allergies, current medications, past family history, past medical history, past social history, past surgical history and problem list.    Review of Systems   Review of Systems  Current obstetric complaints : Nausea   All systems reviewed and otherwise  normal.    I have reviewed and agree with the HPI, ROS, and historical information as entered above. Cristopher Cho MD    /66   Wt 83.6 kg (184 lb 6.4 oz)   BMI 33.73 kg/m²     Physical Exam  General:  well developed; well nourished      Chest/Respiratory:    Heart:     Thyroid:    Breasts:     Abdomen:    Pelvis:         Assessment and Plan/ previous  patient wants a  and a tubal    Problem List Items Addressed This Visit        Other    AMA (advanced maternal age) primigravida 35+    Late prenatal care      Other Visit Diagnoses     19 weeks gestation of pregnancy    -  Primary    Relevant Orders    Obstetric Panel    HIV-1 / O / 2 Ag / Antibody 4th Generation    Urine Drug Screen - Urine, Clean Catch    Urine Culture - Urine, Urine, Clean Catch          1. Pregnancy at 19w0d  2. Reviewed routine prenatal care with the office and educational materials given  Return in about 4 weeks (around 2020).      Cristopher Cho MD  10/28/2020

## 2020-11-02 ENCOUNTER — RESULTS ENCOUNTER (OUTPATIENT)
Dept: OBSTETRICS AND GYNECOLOGY | Facility: CLINIC | Age: 40
End: 2020-11-02

## 2020-11-02 DIAGNOSIS — Z3A.19 19 WEEKS GESTATION OF PREGNANCY: ICD-10-CM

## 2020-11-25 ENCOUNTER — ROUTINE PRENATAL (OUTPATIENT)
Dept: OBSTETRICS AND GYNECOLOGY | Facility: CLINIC | Age: 40
End: 2020-11-25

## 2020-11-25 VITALS — SYSTOLIC BLOOD PRESSURE: 124 MMHG | BODY MASS INDEX: 35.3 KG/M2 | WEIGHT: 193 LBS | DIASTOLIC BLOOD PRESSURE: 60 MMHG

## 2020-11-25 DIAGNOSIS — Z3A.23 23 WEEKS GESTATION OF PREGNANCY: Primary | ICD-10-CM

## 2020-11-25 LAB
EXPIRATION DATE: NORMAL
GLUCOSE UR STRIP-MCNC: NEGATIVE MG/DL
Lab: NORMAL
PROT UR STRIP-MCNC: NEGATIVE MG/DL

## 2020-11-25 PROCEDURE — 90686 IIV4 VACC NO PRSV 0.5 ML IM: CPT | Performed by: NURSE PRACTITIONER

## 2020-11-25 PROCEDURE — 0502F SUBSEQUENT PRENATAL CARE: CPT | Performed by: NURSE PRACTITIONER

## 2020-11-25 PROCEDURE — 90472 IMMUNIZATION ADMIN EACH ADD: CPT | Performed by: NURSE PRACTITIONER

## 2020-11-25 PROCEDURE — 90471 IMMUNIZATION ADMIN: CPT | Performed by: NURSE PRACTITIONER

## 2020-11-25 PROCEDURE — 90715 TDAP VACCINE 7 YRS/> IM: CPT | Performed by: NURSE PRACTITIONER

## 2020-11-25 NOTE — PROGRESS NOTES
OB FOLLOW UP  CC- Here for care of pregnancy        Steven Watt is a 40 y.o.  23w0d patient being seen today for her obstetrical follow up visit. Patient reports she is having some edema in her hands and feet.  She plans on having a tubal at the time of her repeat .  She will sign consents in the office today.    Her prenatal care is complicated by (and status) :    Patient Active Problem List   Diagnosis   • AMA (advanced maternal age) primigravida 35+   • False labor after 37 weeks of gestation without delivery   • Post-dates pregnancy   • Late prenatal care       Flu Status: Will give in office today   Tdap Status: Will give today  Rhogam Status:  Rh Positive  Ultrasound Today: No.    ROS -   Patient Reports : edema  Patient Denies: vaginal bleeding, loss of fluids, nausea, vomiting, cramping, timmy zaidi, contractions, headaches, vision changes, dysuria, constipation, heartburn  Fetal Movement : normal  All other systems reviewed and are negative.       The additional following portions of the patient's history were reviewed and updated as appropriate: allergies, current medications, past family history, past medical history, past social history, past surgical history and problem list.    I have reviewed and agree with the HPI, ROS, and historical information as entered above. Marcella Marvin, APRN    /60   Wt 87.5 kg (193 lb)   BMI 35.30 kg/m²       EXAM:     FHT: 151 BPM  UNABLE TO HEAR FETAL HEART TONES WITH DOPPLER. ULTRASOUND PERFORMED TO CHECK FETAL VIABILITY. ULTRASOUND SHOWED FHTs 151 bpm.   Uterine Size: 30cm  Pelvic Exam: No    Urine glucose/protein: See prenatal flowsheet       Assessment and Plan    Problem List Items Addressed This Visit     None      Visit Diagnoses     23 weeks gestation of pregnancy    -  Primary    Relevant Orders    POC Glucose, Urine, Qualitative, Dipstick    POC Protein, Urine, Qualitative, Dipstick    Gestational Screen 1 Hr ([facility])    Obstetric  Panel    HIV-1 / O / 2 Ag / Antibody 4th Generation    Chlamydia trachomatis, Neisseria gonorrhoeae, PCR w/ confirmation - Urine, Urine, Clean Catch    Urine Drug Screen - Urine, Clean Catch    Urine Culture - Urine, Urine, Clean Catch    Urinalysis With Microscopic - Urine, Clean Catch    US Ob Limited 1 + Fetuses    Chlamydia trachomatis, Neisseria gonorrhoeae, PCR w/ confirmation - Urine, Urine, Clean Catch    Urine Culture - Urine, Urine, Clean Catch    Urinalysis With Microscopic - Urine, Clean Catch    Urine Drug Screen - Urine, Clean Catch          1. Pregnancy at 23w0d  2. Fetal status reassuring.   3. Activity and Exercise discussed.  4. Ultrasound today since unable to hear fetal heart tones with doppler. RIGO since measuring ahead. FHR= 151 bpm by ultrasound and RIGO= normal. Ultrasound performed to check fetal viability.   5. Pt. Was given glucola drink at today's visit upon arrival so glucose screen today. She had prenatal labs today since did not have them at last visit.   6. Tdap and flu vaccine today.  7. Tubal consent form signed today.     Marcella Wong, APRN  11/25/2020

## 2020-11-26 LAB — GLUCOSE 1H P 50 G GLC PO SERPL-MCNC: 103 MG/DL (ref 65–139)

## 2020-11-28 LAB
AMPHETAMINES UR QL SCN: NEGATIVE NG/ML
APPEARANCE UR: CLEAR
BACTERIA #/AREA URNS HPF: ABNORMAL /[HPF]
BACTERIA UR CULT: NO GROWTH
BACTERIA UR CULT: NORMAL
BARBITURATES UR QL SCN: NEGATIVE NG/ML
BENZODIAZ UR QL SCN: NEGATIVE NG/ML
BILIRUB UR QL STRIP: NEGATIVE
BZE UR QL SCN: NEGATIVE NG/ML
C TRACH RRNA SPEC QL NAA+PROBE: NEGATIVE
CANNABINOIDS UR QL SCN: NEGATIVE NG/ML
COLOR UR: YELLOW
CREAT UR-MCNC: 130.6 MG/DL (ref 20–300)
CRYSTALS URNS MICRO: ABNORMAL
EPI CELLS #/AREA URNS HPF: ABNORMAL /HPF (ref 0–10)
GLUCOSE UR QL: NEGATIVE
HGB UR QL STRIP: NEGATIVE
KETONES UR QL STRIP: NEGATIVE
LABORATORY COMMENT REPORT: NORMAL
LEUKOCYTE ESTERASE UR QL STRIP: NEGATIVE
METHADONE UR QL SCN: NEGATIVE NG/ML
MICRO URNS: NORMAL
MICRO URNS: NORMAL
MUCOUS THREADS URNS QL MICRO: PRESENT
N GONORRHOEA RRNA SPEC QL NAA+PROBE: NEGATIVE
NITRITE UR QL STRIP: NEGATIVE
OPIATES UR QL SCN: NEGATIVE NG/ML
OXYCODONE+OXYMORPHONE UR QL SCN: NEGATIVE NG/ML
PCP UR QL: NEGATIVE NG/ML
PH UR STRIP: 6 [PH] (ref 5–7.5)
PH UR: 6.7 [PH] (ref 4.5–8.9)
PROPOXYPH UR QL SCN: NEGATIVE NG/ML
PROT UR QL STRIP: NORMAL
RBC #/AREA URNS HPF: ABNORMAL /HPF (ref 0–2)
SP GR UR: 1.02 (ref 1–1.03)
UNIDENT CRYS URNS QL MICRO: PRESENT
UROBILINOGEN UR STRIP-MCNC: 0.2 MG/DL (ref 0.2–1)
WBC #/AREA URNS HPF: ABNORMAL /HPF (ref 0–5)

## 2020-12-02 LAB
ABO GROUP BLD: ABNORMAL
BASOPHILS # BLD AUTO: 0 X10E3/UL (ref 0–0.2)
BASOPHILS NFR BLD AUTO: 0 %
BLD GP AB SCN SERPL QL: NEGATIVE
EOSINOPHIL # BLD AUTO: 0.1 X10E3/UL (ref 0–0.4)
EOSINOPHIL NFR BLD AUTO: 1 %
ERYTHROCYTE [DISTWIDTH] IN BLOOD BY AUTOMATED COUNT: 12.4 % (ref 11.7–15.4)
HBV SURFACE AG SERPL QL IA: NEGATIVE
HCT VFR BLD AUTO: 31.6 % (ref 34–46.6)
HCV AB S/CO SERPL IA: <0.1 S/CO RATIO (ref 0–0.9)
HGB BLD-MCNC: 10.8 G/DL (ref 11.1–15.9)
HIV 1+2 AB+HIV1 P24 AG SERPL QL IA: NON REACTIVE
IMM GRANULOCYTES # BLD AUTO: 0.1 X10E3/UL (ref 0–0.1)
IMM GRANULOCYTES NFR BLD AUTO: 1 %
LYMPHOCYTES # BLD AUTO: 1.8 X10E3/UL (ref 0.7–3.1)
LYMPHOCYTES NFR BLD AUTO: 21 %
MCH RBC QN AUTO: 32.5 PG (ref 26.6–33)
MCHC RBC AUTO-ENTMCNC: 34.2 G/DL (ref 31.5–35.7)
MCV RBC AUTO: 95 FL (ref 79–97)
MONOCYTES # BLD AUTO: 0.5 X10E3/UL (ref 0.1–0.9)
MONOCYTES NFR BLD AUTO: 6 %
NEUTROPHILS # BLD AUTO: 6 X10E3/UL (ref 1.4–7)
NEUTROPHILS NFR BLD AUTO: 71 %
PLATELET # BLD AUTO: 250 X10E3/UL (ref 150–450)
RBC # BLD AUTO: 3.32 X10E6/UL (ref 3.77–5.28)
RH BLD: POSITIVE
RPR SER QL: NON REACTIVE
RUBV IGG SERPL IA-ACNC: 1.86 INDEX
UNABLE TO VOID: NORMAL
WBC # BLD AUTO: 8.5 X10E3/UL (ref 3.4–10.8)

## 2020-12-22 ENCOUNTER — ROUTINE PRENATAL (OUTPATIENT)
Dept: OBSTETRICS AND GYNECOLOGY | Facility: CLINIC | Age: 40
End: 2020-12-22

## 2020-12-22 VITALS — BODY MASS INDEX: 35.85 KG/M2 | DIASTOLIC BLOOD PRESSURE: 68 MMHG | WEIGHT: 196 LBS | SYSTOLIC BLOOD PRESSURE: 126 MMHG

## 2020-12-22 DIAGNOSIS — Z3A.26 26 WEEKS GESTATION OF PREGNANCY: Primary | ICD-10-CM

## 2020-12-22 LAB
GLUCOSE UR STRIP-MCNC: NEGATIVE MG/DL
PROT UR STRIP-MCNC: ABNORMAL MG/DL

## 2020-12-22 PROCEDURE — 0502F SUBSEQUENT PRENATAL CARE: CPT | Performed by: OBSTETRICS & GYNECOLOGY

## 2020-12-22 NOTE — PROGRESS NOTES
OB FOLLOW UP    Steven Watt is a 40 y.o.  26w6d patient being seen today for her obstetrical follow up visit. Patient reports fatigue.     Her prenatal care is complicated by (and status) : prior C/S    ROS -   Contractions denies   problems denies  GI problems denies  Bleeding or Leaking denies  Fetal Movement : good      Current Outpatient Medications:   •  Prenatal Vit-Fe Fumarate-FA (PRENATAL -) 27-1 MG tablet tablet, Take  by mouth Daily., Disp: , Rfl:     /68   Wt 88.9 kg (196 lb)   BMI 35.85 kg/m²     FHT:  150 BPM    Uterine Size: size equals dates   Presentations: unsure        Uterus-nontender   Assessment    1. Pregnancy at 26w6d  2. Fetal status reassuring     Problem List Items Addressed This Visit     None      Visit Diagnoses     26 weeks gestation of pregnancy    -  Primary    Relevant Orders    POC Urinalysis Dipstick (Completed)          Plan    1. P/patient return in 4 weeks weeks  2. Prenatal teaching done and patient questions were answered  3. Normal exam at 26 and 6    Cristopher Cho MD  2020

## 2021-01-19 ENCOUNTER — ROUTINE PRENATAL (OUTPATIENT)
Dept: OBSTETRICS AND GYNECOLOGY | Facility: CLINIC | Age: 41
End: 2021-01-19

## 2021-01-19 VITALS — DIASTOLIC BLOOD PRESSURE: 60 MMHG | WEIGHT: 204 LBS | SYSTOLIC BLOOD PRESSURE: 120 MMHG | BODY MASS INDEX: 37.31 KG/M2

## 2021-01-19 DIAGNOSIS — Z98.891 PREVIOUS CESAREAN SECTION: ICD-10-CM

## 2021-01-19 DIAGNOSIS — O09.513 PRIMIGRAVIDA OF ADVANCED MATERNAL AGE IN THIRD TRIMESTER: ICD-10-CM

## 2021-01-19 DIAGNOSIS — Z3A.30 30 WEEKS GESTATION OF PREGNANCY: Primary | ICD-10-CM

## 2021-01-19 LAB
GLUCOSE UR STRIP-MCNC: ABNORMAL MG/DL
PROT UR STRIP-MCNC: NEGATIVE MG/DL

## 2021-01-19 PROCEDURE — 0502F SUBSEQUENT PRENATAL CARE: CPT | Performed by: OBSTETRICS & GYNECOLOGY

## 2021-01-19 NOTE — PROGRESS NOTES
OB FOLLOW UP    Steven Watt is a 40 y.o.  30w6d patient being seen today for her obstetrical follow up visit. Patient reports no complaints.     Her prenatal care is complicated by (and status) : glucosuria today. Gtt wnl last visit.    ROS -   Contractions: denies   problems: denies  GI problems: denies  Bleeding or Leaking: denies  Fetal Movement : normal      Current Outpatient Medications:   •  Prenatal Vit-Fe Fumarate-FA (PRENATAL ) 27-1 MG tablet tablet, Take  by mouth Daily., Disp: , Rfl:     /60   Wt 92.5 kg (204 lb)   BMI 37.31 kg/m²     FHT:  140 BPM    Uterine Size: size equals dates   Presentations: unsure        Uterus-nontender   Assessment    1. Pregnancy at 30w6d  2. Fetal status reassuring     Problem List Items Addressed This Visit        Other    AMA (advanced maternal age) primigravida 35+    Previous  section      Other Visit Diagnoses     30 weeks gestation of pregnancy    -  Primary    Relevant Orders    POC Urinalysis Dipstick (Completed)          Plan    1. P/patient return in 2 weeks   2. Prenatal teaching done and patient questions were answered  3. Schedule repeat  at 39 weeks tubal    Cristopher Orlando Cho MD  2021

## 2021-02-03 ENCOUNTER — ROUTINE PRENATAL (OUTPATIENT)
Dept: OBSTETRICS AND GYNECOLOGY | Facility: CLINIC | Age: 41
End: 2021-02-03

## 2021-02-03 VITALS — BODY MASS INDEX: 38.15 KG/M2 | DIASTOLIC BLOOD PRESSURE: 64 MMHG | SYSTOLIC BLOOD PRESSURE: 118 MMHG | WEIGHT: 208.6 LBS

## 2021-02-03 DIAGNOSIS — O09.523 MULTIGRAVIDA OF ADVANCED MATERNAL AGE IN THIRD TRIMESTER: ICD-10-CM

## 2021-02-03 DIAGNOSIS — Z98.891 PREVIOUS CESAREAN SECTION: ICD-10-CM

## 2021-02-03 DIAGNOSIS — Z3A.33 33 WEEKS GESTATION OF PREGNANCY: Primary | ICD-10-CM

## 2021-02-03 DIAGNOSIS — O09.513 PRIMIGRAVIDA OF ADVANCED MATERNAL AGE IN THIRD TRIMESTER: ICD-10-CM

## 2021-02-03 LAB
GLUCOSE UR STRIP-MCNC: NEGATIVE MG/DL
PROT UR STRIP-MCNC: ABNORMAL MG/DL

## 2021-02-03 PROCEDURE — 0502F SUBSEQUENT PRENATAL CARE: CPT | Performed by: OBSTETRICS & GYNECOLOGY

## 2021-02-03 NOTE — PROGRESS NOTES
OB FOLLOW UP  CC- Here for care of pregnancy        Steven Watt is a 40 y.o.  33w0d patient being seen today for her obstetrical follow up visit. Patient reports no complaints.     Her prenatal care is complicated by (and status) :    Patient Active Problem List   Diagnosis   • AMA (advanced maternal age) primigravida 35+   • False labor after 37 weeks of gestation without delivery   • Post-dates pregnancy   • Late prenatal care   • Previous  section       Flu Status: Already given in current flu season  Ultrasound Today: No.    ROS -   Patient Reports : No Problems  Patient Denies: Loss of Fluid, Vaginal Spotting, Vision Changes, Headaches, Nausea , Vomiting , Contractions and Epigastric pain  Fetal Movement : normal  All other systems reviewed and are negative.       The additional following portions of the patient's history were reviewed and updated as appropriate: allergies, current medications, past family history, past medical history, past social history, past surgical history and problem list.    I have reviewed and agree with the HPI, ROS, and historical information as entered above. Cristopher Cho MD    /64   Wt 94.6 kg (208 lb 9.6 oz)   BMI 38.15 kg/m²       EXAM:     FHT: 140 BPM   Uterine Size: size equals dates  Pelvic Exam: No    Urine glucose/protein: See prenatal flowsheet       Assessment and Plan    Problem List Items Addressed This Visit        Other    AMA (advanced maternal age) primigravida 35+    Previous  section      Other Visit Diagnoses     33 weeks gestation of pregnancy    -  Primary    Relevant Orders    POC Urinalysis Dipstick (Completed)    US Ob Follow Up Transabdominal Approach    Multigravida of advanced maternal age in third trimester        Relevant Orders    US Ob Follow Up Transabdominal Approach          1. Pregnancy at 33w0d  2. Fetal status reassuring.   3. Activity and Exercise discussed.  Return in about 2 weeks (around  2/17/2021), or US in 2 weeks,dx-advanced maternal age.  Ultrasound in 2 weeks due to advanced maternal age  Cristopher Cho MD  02/03/2021      Review of Systems

## 2021-02-17 ENCOUNTER — ROUTINE PRENATAL (OUTPATIENT)
Dept: OBSTETRICS AND GYNECOLOGY | Facility: CLINIC | Age: 41
End: 2021-02-17

## 2021-02-17 ENCOUNTER — TELEPHONE (OUTPATIENT)
Dept: OBSTETRICS AND GYNECOLOGY | Facility: CLINIC | Age: 41
End: 2021-02-17

## 2021-02-17 VITALS — WEIGHT: 207 LBS | BODY MASS INDEX: 37.86 KG/M2 | DIASTOLIC BLOOD PRESSURE: 68 MMHG | SYSTOLIC BLOOD PRESSURE: 118 MMHG

## 2021-02-17 DIAGNOSIS — O09.523 MULTIGRAVIDA OF ADVANCED MATERNAL AGE IN THIRD TRIMESTER: ICD-10-CM

## 2021-02-17 DIAGNOSIS — Z3A.35 35 WEEKS GESTATION OF PREGNANCY: Primary | ICD-10-CM

## 2021-02-17 PROCEDURE — 0502F SUBSEQUENT PRENATAL CARE: CPT | Performed by: OBSTETRICS & GYNECOLOGY

## 2021-02-17 NOTE — PROGRESS NOTES
OB FOLLOW UP  CC- Here for care of pregnancy        Steven Watt is a 40 y.o.  35w0d patient being seen today for her obstetrical follow up visit. Patient reports no complaints.  Wants to get C/S scheduled.     Her prenatal care is complicated by (and status) :    Patient Active Problem List   Diagnosis   • AMA (advanced maternal age) primigravida 35+   • False labor after 37 weeks of gestation without delivery   • Post-dates pregnancy   • Late prenatal care   • Previous  section       Flu Status: Already given in current flu season  Ultrasound Today: Yes.      ROS -   Patient Reports : No Problems  Patient Denies: Loss of Fluid, Vaginal Spotting, Vision Changes, Headaches, Nausea , Vomiting , Contractions and Epigastric pain  Fetal Movement : normal  All other systems reviewed and are negative.       The additional following portions of the patient's history were reviewed and updated as appropriate: allergies, current medications, past family history, past medical history, past social history, past surgical history and problem list.    I have reviewed and agree with the HPI, ROS, and historical information as entered above. Lavern Pickett, APRN    /68   Wt 93.9 kg (207 lb)   BMI 37.86 kg/m²       EXAM:     FHT: 140 BPM     Urine glucose/protein: See prenatal flowsheet       Assessment and Plan    Problem List Items Addressed This Visit     None      Visit Diagnoses     35 weeks gestation of pregnancy    -  Primary    Relevant Orders    POC Glucose, Urine, Qualitative, Dipstick (Completed)    POC Protein, Urine, Qualitative, Dipstick (Completed)    Multigravida of advanced maternal age in third trimester              1. Pregnancy at 35w0d  2. Fetal status reassuring. U/S today, EFW 53%, RIGO 12.2.   3. Wants to get C/S scheduled at 39 weeks.   Return in about 1 week (around 2021) for Dr Cho. Jarret counts reviewed.       Lavern Pickett, APRN  2021

## 2021-02-24 ENCOUNTER — ROUTINE PRENATAL (OUTPATIENT)
Dept: OBSTETRICS AND GYNECOLOGY | Facility: CLINIC | Age: 41
End: 2021-02-24

## 2021-02-24 VITALS — BODY MASS INDEX: 38.59 KG/M2 | SYSTOLIC BLOOD PRESSURE: 122 MMHG | DIASTOLIC BLOOD PRESSURE: 70 MMHG | WEIGHT: 211 LBS

## 2021-02-24 DIAGNOSIS — O09.513 PRIMIGRAVIDA OF ADVANCED MATERNAL AGE IN THIRD TRIMESTER: ICD-10-CM

## 2021-02-24 DIAGNOSIS — Z98.891 PREVIOUS CESAREAN SECTION: ICD-10-CM

## 2021-02-24 DIAGNOSIS — O09.523 MULTIGRAVIDA OF ADVANCED MATERNAL AGE IN THIRD TRIMESTER: ICD-10-CM

## 2021-02-24 DIAGNOSIS — Z3A.36 36 WEEKS GESTATION OF PREGNANCY: Primary | ICD-10-CM

## 2021-02-24 LAB
GLUCOSE UR STRIP-MCNC: NEGATIVE MG/DL
PROT UR STRIP-MCNC: NEGATIVE MG/DL

## 2021-02-24 PROCEDURE — 59025 FETAL NON-STRESS TEST: CPT | Performed by: OBSTETRICS & GYNECOLOGY

## 2021-02-24 PROCEDURE — 0502F SUBSEQUENT PRENATAL CARE: CPT | Performed by: OBSTETRICS & GYNECOLOGY

## 2021-02-24 NOTE — PROGRESS NOTES
OB FOLLOW UP    Steven Watt is a 40 y.o.  36w0d patient being seen today for her obstetrical follow up visit. Patient reports vaginal pains -     Her prenatal care is complicated by (and status) : Prv , late prenatal care  Wants r/c-s and tubal ligation   Signed Tubal Consents 2020  prenatal labs done with 28 week labs  Repeat C/S tubal 3/18/21 PAT the 15th @10am and covid 9am Dam[place]     ROS -   Contractions denies   problems denies  GI problems denies  Bleeding or Leaking denies  Fetal Movement : good      Current Outpatient Medications:   •  Prenatal Vit-Fe Fumarate-FA (PRENATAL -) 27-1 MG tablet tablet, Take  by mouth Daily., Disp: , Rfl:     /70   Wt 95.7 kg (211 lb)   BMI 38.59 kg/m²     FHT:  140 BPM    Uterine Size: size equals dates   Presentations: unsure        Uterus-nontender   Assessment    1. Pregnancy at 36w0d  2. Fetal status reassuring     Problem List Items Addressed This Visit        Gravid and     AMA (advanced maternal age) primigravida 35+    Previous  section      Other Visit Diagnoses     36 weeks gestation of pregnancy    -  Primary    Relevant Orders    POC Urinalysis Dipstick    Fetal Nonstress Test    Multigravida of advanced maternal age in third trimester        Relevant Orders    POC Urinalysis Dipstick    Fetal Nonstress Test          Plan    1. P/patient return in 1 weeks  2. Prenatal teaching done and patient questions were answered  3. Needs weekly NSTs due to maternal age    Cristopher Cho MD  2021

## 2021-03-03 ENCOUNTER — ROUTINE PRENATAL (OUTPATIENT)
Dept: OBSTETRICS AND GYNECOLOGY | Facility: CLINIC | Age: 41
End: 2021-03-03

## 2021-03-03 VITALS — WEIGHT: 210 LBS | DIASTOLIC BLOOD PRESSURE: 70 MMHG | BODY MASS INDEX: 38.41 KG/M2 | SYSTOLIC BLOOD PRESSURE: 120 MMHG

## 2021-03-03 DIAGNOSIS — O09.523 MULTIGRAVIDA OF ADVANCED MATERNAL AGE IN THIRD TRIMESTER: ICD-10-CM

## 2021-03-03 DIAGNOSIS — Z3A.37 37 WEEKS GESTATION OF PREGNANCY: Primary | ICD-10-CM

## 2021-03-03 LAB
GLUCOSE UR STRIP-MCNC: NEGATIVE MG/DL
PROT UR STRIP-MCNC: NEGATIVE MG/DL

## 2021-03-03 PROCEDURE — 99213 OFFICE O/P EST LOW 20 MIN: CPT | Performed by: OBSTETRICS & GYNECOLOGY

## 2021-03-03 NOTE — PROGRESS NOTES
OB FOLLOW UP    Steven Watt is a 40 y.o.  37w0d patient being seen today for her obstetrical follow up visit. Patient reports baby is very active.     Her prenatal care is complicated by (and status) : AMA, prior C/S    ROS -   Contractions denies   problems denies  GI problems denies  Bleeding or Leaking denies  Fetal Movement : good      Current Outpatient Medications:   •  Prenatal Vit-Fe Fumarate-FA (PRENATAL -) 27-1 MG tablet tablet, Take  by mouth Daily., Disp: , Rfl:     /70   Wt 95.3 kg (210 lb)   BMI 38.41 kg/m²     FHT:  140 BPM    Uterine Size: size equals dates   Presentations: unsure        Uterus-nontender   Assessment    1. Pregnancy at 37w0d  2. Fetal status reassuring     Problem List Items Addressed This Visit     None      Visit Diagnoses     37 weeks gestation of pregnancy    -  Primary    Relevant Orders    POC Urinalysis Dipstick (Completed)    Multigravida of advanced maternal age in third trimester        Relevant Orders    POC Urinalysis Dipstick (Completed)          Plan    1. P/patient return in 1 week  2. Prenatal teaching done and patient questions were answered  3. Weekly NSTs due to advanced maternal age  C-sections changed to  at 7:30 AM ,PAT and Covid testing on   Gilmar Schmidt RN  2021

## 2021-03-10 ENCOUNTER — ROUTINE PRENATAL (OUTPATIENT)
Dept: OBSTETRICS AND GYNECOLOGY | Facility: CLINIC | Age: 41
End: 2021-03-10

## 2021-03-10 VITALS — BODY MASS INDEX: 38.78 KG/M2 | WEIGHT: 212 LBS

## 2021-03-10 DIAGNOSIS — Z3A.38 38 WEEKS GESTATION OF PREGNANCY: Primary | ICD-10-CM

## 2021-03-10 DIAGNOSIS — O09.523 MULTIGRAVIDA OF ADVANCED MATERNAL AGE IN THIRD TRIMESTER: ICD-10-CM

## 2021-03-10 DIAGNOSIS — Z98.891 PREVIOUS CESAREAN SECTION: ICD-10-CM

## 2021-03-10 PROCEDURE — 0502F SUBSEQUENT PRENATAL CARE: CPT | Performed by: OBSTETRICS & GYNECOLOGY

## 2021-03-10 NOTE — PROGRESS NOTES
OB FOLLOW UP  CC- Here for care of pregnancy        Steven Watt is a 41 y.o.  38w0d patient being seen today for her obstetrical follow up visit. Patient reports mild swelling in her hands and feet.     She denies LOF, vaginal spotting, headaches, vision changes or timmy zaidi/contractions. She reports good fetal movement, >10 movements in 10 hours.     Her prenatal care is complicated by (and status) :    Patient Active Problem List   Diagnosis   • AMA (advanced maternal age) primigravida 35+   • False labor after 37 weeks of gestation without delivery   • Post-dates pregnancy   • Late prenatal care   • Previous  section   • 38 weeks gestation of pregnancy   • Multigravida of advanced maternal age in third trimester       Ultrasound Today: No.  Non Stress Test: Yes minutes: 20 minutes  non-stress test: NST: Reactive  indication: Advanced Maternal Age  category: Category I    ROS -   Patient Reports : mild swelling in her hands and feet  Patient Denies: Loss of Fluid, Vaginal Spotting, Vision Changes, Headaches, Nausea , Vomiting , Contractions and Epigastric pain  Fetal Movement : >10 movements in 10 hours  All other systems reviewed and are negative.       The additional following portions of the patient's history were reviewed and updated as appropriate: allergies, current medications, past family history, past medical history, past social history, past surgical history and problem list.    I have reviewed and agree with the HPI, ROS, and historical information as entered above. Cristopher Cho MD    Wt 96.2 kg (212 lb)   BMI 38.78 kg/m²       EXAM:     FHT: 150 BPM   Uterine Size: size equals dates  Pelvic Exam: No    Urine glucose/protein: See prenatal flowsheet       Assessment and Plan    Problem List Items Addressed This Visit        Gravid and     Previous  section    38 weeks gestation of pregnancy - Primary    Relevant Orders    POC Glucose, Urine, Qualitative,  Dipstick (Completed)    POC Protein, Urine, Qualitative, Dipstick (Completed)    Fetal Nonstress Test    Multigravida of advanced maternal age in third trimester    Relevant Orders    POC Glucose, Urine, Qualitative, Dipstick (Completed)    POC Protein, Urine, Qualitative, Dipstick (Completed)    Fetal Nonstress Test          1. Pregnancy at 38w0d  2. Fetal status reassuring.   3. Activity and Exercise discussed.  Return in about 5 days (around 3/15/2021), or Preop , for prenatal visit.    Cristopher Cho MD  03/10/2021

## 2021-03-15 ENCOUNTER — ROUTINE PRENATAL (OUTPATIENT)
Dept: OBSTETRICS AND GYNECOLOGY | Facility: CLINIC | Age: 41
End: 2021-03-15

## 2021-03-15 ENCOUNTER — PREP FOR SURGERY (OUTPATIENT)
Dept: OTHER | Facility: HOSPITAL | Age: 41
End: 2021-03-15

## 2021-03-15 ENCOUNTER — APPOINTMENT (OUTPATIENT)
Dept: PREADMISSION TESTING | Facility: HOSPITAL | Age: 41
End: 2021-03-15

## 2021-03-15 VITALS — SYSTOLIC BLOOD PRESSURE: 118 MMHG | DIASTOLIC BLOOD PRESSURE: 78 MMHG

## 2021-03-15 VITALS — WEIGHT: 214 LBS | HEIGHT: 63 IN | BODY MASS INDEX: 37.92 KG/M2

## 2021-03-15 DIAGNOSIS — O09.523 MULTIGRAVIDA OF ADVANCED MATERNAL AGE IN THIRD TRIMESTER: ICD-10-CM

## 2021-03-15 DIAGNOSIS — Z98.891 PREVIOUS CESAREAN SECTION: Primary | ICD-10-CM

## 2021-03-15 DIAGNOSIS — Z3A.38 38 WEEKS GESTATION OF PREGNANCY: Primary | ICD-10-CM

## 2021-03-15 DIAGNOSIS — Z30.2 ENCOUNTER FOR STERILIZATION: ICD-10-CM

## 2021-03-15 DIAGNOSIS — Z98.891 PREVIOUS CESAREAN SECTION: ICD-10-CM

## 2021-03-15 DIAGNOSIS — O09.513 PRIMIGRAVIDA OF ADVANCED MATERNAL AGE IN THIRD TRIMESTER: ICD-10-CM

## 2021-03-15 LAB
ABO GROUP BLD: NORMAL
BLD GP AB SCN SERPL QL: NEGATIVE
DEPRECATED RDW RBC AUTO: 45.5 FL (ref 37–54)
ERYTHROCYTE [DISTWIDTH] IN BLOOD BY AUTOMATED COUNT: 12.7 % (ref 12.3–15.4)
GLUCOSE UR STRIP-MCNC: ABNORMAL MG/DL
HCT VFR BLD AUTO: 36.4 % (ref 34–46.6)
HGB BLD-MCNC: 11.9 G/DL (ref 12–15.9)
MCH RBC QN AUTO: 32.2 PG (ref 26.6–33)
MCHC RBC AUTO-ENTMCNC: 32.7 G/DL (ref 31.5–35.7)
MCV RBC AUTO: 98.4 FL (ref 79–97)
PLATELET # BLD AUTO: 251 10*3/MM3 (ref 140–450)
PMV BLD AUTO: 9.3 FL (ref 6–12)
PROT UR STRIP-MCNC: NEGATIVE MG/DL
RBC # BLD AUTO: 3.7 10*6/MM3 (ref 3.77–5.28)
RH BLD: POSITIVE
SARS-COV-2 RNA NOSE QL NAA+PROBE: NOT DETECTED
T&S EXPIRATION DATE: NORMAL
WBC # BLD AUTO: 6.06 10*3/MM3 (ref 3.4–10.8)

## 2021-03-15 PROCEDURE — 86900 BLOOD TYPING SEROLOGIC ABO: CPT | Performed by: OBSTETRICS & GYNECOLOGY

## 2021-03-15 PROCEDURE — U0004 COV-19 TEST NON-CDC HGH THRU: HCPCS

## 2021-03-15 PROCEDURE — 0502F SUBSEQUENT PRENATAL CARE: CPT | Performed by: OBSTETRICS & GYNECOLOGY

## 2021-03-15 PROCEDURE — 59025 FETAL NON-STRESS TEST: CPT | Performed by: OBSTETRICS & GYNECOLOGY

## 2021-03-15 PROCEDURE — 86850 RBC ANTIBODY SCREEN: CPT | Performed by: OBSTETRICS & GYNECOLOGY

## 2021-03-15 PROCEDURE — 86901 BLOOD TYPING SEROLOGIC RH(D): CPT | Performed by: OBSTETRICS & GYNECOLOGY

## 2021-03-15 PROCEDURE — 85027 COMPLETE CBC AUTOMATED: CPT | Performed by: OBSTETRICS & GYNECOLOGY

## 2021-03-15 PROCEDURE — C9803 HOPD COVID-19 SPEC COLLECT: HCPCS

## 2021-03-15 RX ORDER — SODIUM CHLORIDE 0.9 % (FLUSH) 0.9 %
10 SYRINGE (ML) INJECTION AS NEEDED
Status: CANCELLED | OUTPATIENT
Start: 2021-03-15

## 2021-03-15 RX ORDER — LIDOCAINE HYDROCHLORIDE 10 MG/ML
5 INJECTION, SOLUTION EPIDURAL; INFILTRATION; INTRACAUDAL; PERINEURAL AS NEEDED
Status: CANCELLED | OUTPATIENT
Start: 2021-03-15

## 2021-03-15 RX ORDER — ACETAMINOPHEN 500 MG
1000 TABLET ORAL ONCE
Status: CANCELLED | OUTPATIENT
Start: 2021-03-15 | End: 2021-03-15

## 2021-03-15 RX ORDER — TRISODIUM CITRATE DIHYDRATE AND CITRIC ACID MONOHYDRATE 500; 334 MG/5ML; MG/5ML
30 SOLUTION ORAL ONCE
Status: CANCELLED | OUTPATIENT
Start: 2021-03-15 | End: 2021-03-15

## 2021-03-15 RX ORDER — KETOROLAC TROMETHAMINE 30 MG/ML
30 INJECTION, SOLUTION INTRAMUSCULAR; INTRAVENOUS ONCE
Status: CANCELLED | OUTPATIENT
Start: 2021-03-15 | End: 2021-03-15

## 2021-03-15 RX ORDER — CEFAZOLIN SODIUM 2 G/100ML
2 INJECTION, SOLUTION INTRAVENOUS ONCE
Status: CANCELLED | OUTPATIENT
Start: 2021-03-15 | End: 2021-03-15

## 2021-03-15 RX ORDER — SODIUM CHLORIDE, SODIUM LACTATE, POTASSIUM CHLORIDE, CALCIUM CHLORIDE 600; 310; 30; 20 MG/100ML; MG/100ML; MG/100ML; MG/100ML
125 INJECTION, SOLUTION INTRAVENOUS CONTINUOUS
Status: CANCELLED | OUTPATIENT
Start: 2021-03-15

## 2021-03-15 RX ORDER — SODIUM CHLORIDE 0.9 % (FLUSH) 0.9 %
3 SYRINGE (ML) INJECTION EVERY 12 HOURS SCHEDULED
Status: CANCELLED | OUTPATIENT
Start: 2021-03-15

## 2021-03-15 RX ORDER — OXYTOCIN-SODIUM CHLORIDE 0.9% IV SOLN 30 UNIT/500ML 30-0.9/5 UT/ML-%
650 SOLUTION INTRAVENOUS ONCE
Status: CANCELLED | OUTPATIENT
Start: 2021-03-15 | End: 2021-03-15

## 2021-03-15 RX ORDER — OXYTOCIN-SODIUM CHLORIDE 0.9% IV SOLN 30 UNIT/500ML 30-0.9/5 UT/ML-%
85 SOLUTION INTRAVENOUS ONCE
Status: CANCELLED | OUTPATIENT
Start: 2021-03-15 | End: 2021-03-15

## 2021-03-15 NOTE — PAT
Patient to apply Chlorhexadine wipes  to surgical area (as instructed) the night before procedure and the AM of procedure. Wipes provided.    What to know before your arrive:     -Do not eat, drink or chew gum beginning 8 hours before your scheduled arrival time to the hospital.  Except please drink 20 ounches of Gatorade and complete two hours before your  Given arrival time to the hospital.  If you drink too close to surgery time, your sugery may  Be delayed or cancelled.  Please complete as instructed.    -Do not shave any part of your body including abdomen or pelvic are for two    days before your procedure.   -If you are taking a scheduled medication (insulin, blood pressure medicine,   antibiotics) please consult with your physician whether to take on the day of   surgery.   -Remove all jewelry including rings, wedding bands, and piercing before coming   to the hospital.   -Leave important valuables at home.   -Do not wear dark fingernail polish.   -Please take two Tylenol 500 mg tablets the evening before surgery.   -Bring the following with you to the hospital:    -Picture ID and insurance, Medicare or Medicaid cards    -Co-pay/deductible required by insurance (Cash, Check, Credit Card)    -Copy of living will or power  document (if applicable)    -CPAP mask and tubing, not machine (if applicable)    -Skin prep instructions sheet    What to know the day of procedure:     -Park in the [facility], take elevator for first floor, exit to the right and  proceed through the doors to outside, follow the covered sidewalk to the  entrance of the [facility], follow the hallway and signs to the [facility],  [facility] BEFORE entering the [facility] lobby.  Take the  elevators to the 3rd floor ([address] [place]).   -Leave unnecessary items in your vehicle, including your suitcase.  Your support  person or a family member can get it for you after your procedure.   -Check in at the reception  desk in the lobby of the 3rd floor ([address] [facility]).   -One person may accompany you to the pre-op/recovery area.  Please have  other family members wait in the waiting room.   -An anesthesiologist will meet with your prior to your procedure.   -After anesthesia has been initiated, one person may accompany you in the  operating room.   -No video cameras are permitted in the operating room; only still cameras,  Please.      What to expect while you are in recovery:     -One person may stay with you while you are in recovery.   -If the baby is stable, he/she may visit to initiate breastfeeding & [facility].    THE FOLLOWING INFORMATION WAS PROVIDED TO PATIENT:     SECTION BOOKLET BY [facility]  PAIN MANAGEMENT   RESPIREX    CHLORHEXIDINE GLUCONATE WIPES AND INSTRUCTIONS GIVEN TO PATIENT      Blood bank bracelet applied to patient during Pre Admission Testing visit.  Patient instructed not to remove from arm until after procedure and they are discharged from the hospital.  Explained to patient that they may shower and get the bracelet wet, but not to immerse under water for longer periods (bathing, swimming, hand dishwashing, etc).  Patient verbalized understanding.    Instructed patient to take two Tylenol extra strength (total of 1000 mg) the night before surgery.

## 2021-03-15 NOTE — PROGRESS NOTES
OB FOLLOW UP    Steven Watt is a 41 y.o.  38w5d patient being seen today for her obstetrical follow up visit. Patient reports occasional contractions.   Pt had PAT and Covid testing already today on NST    Her prenatal care is complicated by (and status) : AMA Prv , late prenatal care, needs weekly NSTs after 36  Wants r/c-s and tubal ligation     ROS -   Contractions irreg   problems denies  GI problems denies  Bleeding or Leaking denies  Fetal Movement : good       Current Outpatient Medications:   •  Ferrous Sulfate (SLOW FE PO), Take 1 tablet by mouth Daily., Disp: , Rfl:   •  Prenatal Vit-Fe Fumarate-FA (PRENATAL ) 27-1 MG tablet tablet, Take 1 tablet by mouth Daily., Disp: , Rfl:     /78     FHT:  150BPM    Uterine Size: size equals dates   Presentations: unsure        Uterus-nontender   Assessment    1. Pregnancy at 38w5d  2. Fetal status reassuring     Problem List Items Addressed This Visit        Genitourinary and Reproductive     Encounter for sterilization    Overview     Informed consent originally obtained greater than 30 days ago            Gravid and     AMA (advanced maternal age) primigravida 35+    Previous  section    Relevant Orders    POC Urinalysis Dipstick (Completed)    38 weeks gestation of pregnancy - Primary    Relevant Orders    POC Urinalysis Dipstick (Completed)    Multigravida of advanced maternal age in third trimester    Relevant Orders    POC Urinalysis Dipstick (Completed)          Plan    1. P/patient return in 2 days  2. Prenatal teaching done and patient questions were answered  Repeat  and tubal in 2 days on   Cristopher Cho MD  03/15/2021

## 2021-03-17 ENCOUNTER — ANESTHESIA EVENT (OUTPATIENT)
Dept: LABOR AND DELIVERY | Facility: HOSPITAL | Age: 41
End: 2021-03-17

## 2021-03-17 ENCOUNTER — ANESTHESIA (OUTPATIENT)
Dept: LABOR AND DELIVERY | Facility: HOSPITAL | Age: 41
End: 2021-03-17

## 2021-03-17 ENCOUNTER — HOSPITAL ENCOUNTER (INPATIENT)
Facility: HOSPITAL | Age: 41
LOS: 3 days | Discharge: HOME | End: 2021-03-20
Attending: OBSTETRICS & GYNECOLOGY | Admitting: OBSTETRICS & GYNECOLOGY

## 2021-03-17 DIAGNOSIS — Z01.818 TUBAL LIGATION EVALUATION: ICD-10-CM

## 2021-03-17 DIAGNOSIS — Z98.891 PREVIOUS CESAREAN SECTION: ICD-10-CM

## 2021-03-17 PROBLEM — O34.219 PREVIOUS CESAREAN DELIVERY AFFECTING PREGNANCY: Status: ACTIVE | Noted: 2021-03-17

## 2021-03-17 PROCEDURE — 58700 REMOVAL OF FALLOPIAN TUBE: CPT | Performed by: OBSTETRICS & GYNECOLOGY

## 2021-03-17 PROCEDURE — 88302 TISSUE EXAM BY PATHOLOGIST: CPT | Performed by: OBSTETRICS & GYNECOLOGY

## 2021-03-17 PROCEDURE — 0UB70ZZ EXCISION OF BILATERAL FALLOPIAN TUBES, OPEN APPROACH: ICD-10-PCS | Performed by: OBSTETRICS & GYNECOLOGY

## 2021-03-17 PROCEDURE — 25010000002 FENTANYL CITRATE (PF) 100 MCG/2ML SOLUTION: Performed by: NURSE ANESTHETIST, CERTIFIED REGISTERED

## 2021-03-17 PROCEDURE — 25010000002 MIDAZOLAM PER 1 MG: Performed by: NURSE ANESTHETIST, CERTIFIED REGISTERED

## 2021-03-17 PROCEDURE — 25010000003 CEFAZOLIN IN DEXTROSE 2-4 GM/100ML-% SOLUTION: Performed by: OBSTETRICS & GYNECOLOGY

## 2021-03-17 PROCEDURE — 25010000002 KETOROLAC TROMETHAMINE PER 15 MG: Performed by: OBSTETRICS & GYNECOLOGY

## 2021-03-17 PROCEDURE — 25010000003 MORPHINE PER 10 MG: Performed by: NURSE ANESTHETIST, CERTIFIED REGISTERED

## 2021-03-17 PROCEDURE — 59510 CESAREAN DELIVERY: CPT | Performed by: OBSTETRICS & GYNECOLOGY

## 2021-03-17 PROCEDURE — 25010000002 ONDANSETRON PER 1 MG: Performed by: OBSTETRICS & GYNECOLOGY

## 2021-03-17 PROCEDURE — 25010000002 ONDANSETRON PER 1 MG: Performed by: NURSE ANESTHETIST, CERTIFIED REGISTERED

## 2021-03-17 PROCEDURE — 59025 FETAL NON-STRESS TEST: CPT

## 2021-03-17 RX ORDER — MORPHINE SULFATE 0.5 MG/ML
INJECTION, SOLUTION EPIDURAL; INTRATHECAL; INTRAVENOUS AS NEEDED
Status: DISCONTINUED | OUTPATIENT
Start: 2021-03-17 | End: 2021-03-17 | Stop reason: SURG

## 2021-03-17 RX ORDER — ACETAMINOPHEN 500 MG
1000 TABLET ORAL EVERY 6 HOURS
Status: COMPLETED | OUTPATIENT
Start: 2021-03-17 | End: 2021-03-18

## 2021-03-17 RX ORDER — SIMETHICONE 80 MG
80 TABLET,CHEWABLE ORAL 4 TIMES DAILY PRN
Status: DISCONTINUED | OUTPATIENT
Start: 2021-03-17 | End: 2021-03-20 | Stop reason: HOSPADM

## 2021-03-17 RX ORDER — ONDANSETRON 2 MG/ML
4 INJECTION INTRAMUSCULAR; INTRAVENOUS ONCE AS NEEDED
Status: DISCONTINUED | OUTPATIENT
Start: 2021-03-17 | End: 2021-03-20 | Stop reason: HOSPADM

## 2021-03-17 RX ORDER — FENTANYL CITRATE 50 UG/ML
50 INJECTION, SOLUTION INTRAMUSCULAR; INTRAVENOUS
Status: DISCONTINUED | OUTPATIENT
Start: 2021-03-17 | End: 2021-03-20 | Stop reason: HOSPADM

## 2021-03-17 RX ORDER — KETOROLAC TROMETHAMINE 15 MG/ML
15 INJECTION, SOLUTION INTRAMUSCULAR; INTRAVENOUS EVERY 6 HOURS
Status: COMPLETED | OUTPATIENT
Start: 2021-03-17 | End: 2021-03-18

## 2021-03-17 RX ORDER — DIPHENHYDRAMINE HYDROCHLORIDE 50 MG/ML
25 INJECTION INTRAMUSCULAR; INTRAVENOUS EVERY 4 HOURS PRN
Status: DISCONTINUED | OUTPATIENT
Start: 2021-03-17 | End: 2021-03-20 | Stop reason: HOSPADM

## 2021-03-17 RX ORDER — MIDAZOLAM HYDROCHLORIDE 1 MG/ML
INJECTION INTRAMUSCULAR; INTRAVENOUS AS NEEDED
Status: DISCONTINUED | OUTPATIENT
Start: 2021-03-17 | End: 2021-03-17 | Stop reason: SURG

## 2021-03-17 RX ORDER — OXYTOCIN-SODIUM CHLORIDE 0.9% IV SOLN 30 UNIT/500ML 30-0.9/5 UT/ML-%
85 SOLUTION INTRAVENOUS ONCE
Status: DISCONTINUED | OUTPATIENT
Start: 2021-03-17 | End: 2021-03-17 | Stop reason: HOSPADM

## 2021-03-17 RX ORDER — FENTANYL CITRATE 50 UG/ML
INJECTION, SOLUTION INTRAMUSCULAR; INTRAVENOUS AS NEEDED
Status: DISCONTINUED | OUTPATIENT
Start: 2021-03-17 | End: 2021-03-17 | Stop reason: SURG

## 2021-03-17 RX ORDER — ACETAMINOPHEN 500 MG
1000 TABLET ORAL ONCE
Status: COMPLETED | OUTPATIENT
Start: 2021-03-17 | End: 2021-03-17

## 2021-03-17 RX ORDER — CEFAZOLIN SODIUM 2 G/100ML
2 INJECTION, SOLUTION INTRAVENOUS ONCE
Status: COMPLETED | OUTPATIENT
Start: 2021-03-17 | End: 2021-03-17

## 2021-03-17 RX ORDER — SODIUM CHLORIDE 0.9 % (FLUSH) 0.9 %
10 SYRINGE (ML) INJECTION AS NEEDED
Status: DISCONTINUED | OUTPATIENT
Start: 2021-03-17 | End: 2021-03-17 | Stop reason: HOSPADM

## 2021-03-17 RX ORDER — DIPHENHYDRAMINE HCL 25 MG
25 CAPSULE ORAL EVERY 4 HOURS PRN
Status: DISCONTINUED | OUTPATIENT
Start: 2021-03-17 | End: 2021-03-20 | Stop reason: HOSPADM

## 2021-03-17 RX ORDER — PROMETHAZINE HYDROCHLORIDE 12.5 MG/1
12.5 SUPPOSITORY RECTAL ONCE AS NEEDED
Status: DISCONTINUED | OUTPATIENT
Start: 2021-03-17 | End: 2021-03-20 | Stop reason: HOSPADM

## 2021-03-17 RX ORDER — LIDOCAINE HYDROCHLORIDE 10 MG/ML
5 INJECTION, SOLUTION EPIDURAL; INFILTRATION; INTRACAUDAL; PERINEURAL AS NEEDED
Status: DISCONTINUED | OUTPATIENT
Start: 2021-03-17 | End: 2021-03-17 | Stop reason: HOSPADM

## 2021-03-17 RX ORDER — OXYTOCIN-SODIUM CHLORIDE 0.9% IV SOLN 30 UNIT/500ML 30-0.9/5 UT/ML-%
SOLUTION INTRAVENOUS AS NEEDED
Status: DISCONTINUED | OUTPATIENT
Start: 2021-03-17 | End: 2021-03-17 | Stop reason: SURG

## 2021-03-17 RX ORDER — BUPIVACAINE HYDROCHLORIDE 7.5 MG/ML
INJECTION, SOLUTION INTRASPINAL AS NEEDED
Status: DISCONTINUED | OUTPATIENT
Start: 2021-03-17 | End: 2021-03-17 | Stop reason: SURG

## 2021-03-17 RX ORDER — OXYTOCIN-SODIUM CHLORIDE 0.9% IV SOLN 30 UNIT/500ML 30-0.9/5 UT/ML-%
650 SOLUTION INTRAVENOUS ONCE
Status: DISCONTINUED | OUTPATIENT
Start: 2021-03-17 | End: 2021-03-17 | Stop reason: HOSPADM

## 2021-03-17 RX ORDER — METOCLOPRAMIDE 10 MG/1
10 TABLET ORAL ONCE AS NEEDED
Status: DISCONTINUED | OUTPATIENT
Start: 2021-03-17 | End: 2021-03-20 | Stop reason: HOSPADM

## 2021-03-17 RX ORDER — ONDANSETRON 2 MG/ML
INJECTION INTRAMUSCULAR; INTRAVENOUS AS NEEDED
Status: DISCONTINUED | OUTPATIENT
Start: 2021-03-17 | End: 2021-03-17 | Stop reason: SURG

## 2021-03-17 RX ORDER — FAMOTIDINE 10 MG/ML
INJECTION, SOLUTION INTRAVENOUS AS NEEDED
Status: DISCONTINUED | OUTPATIENT
Start: 2021-03-17 | End: 2021-03-17 | Stop reason: SURG

## 2021-03-17 RX ORDER — BUPIVACAINE HCL/0.9 % NACL/PF 0.125 %
PLASTIC BAG, INJECTION (ML) EPIDURAL AS NEEDED
Status: DISCONTINUED | OUTPATIENT
Start: 2021-03-17 | End: 2021-03-17 | Stop reason: SURG

## 2021-03-17 RX ORDER — SODIUM CHLORIDE 0.9 % (FLUSH) 0.9 %
3 SYRINGE (ML) INJECTION EVERY 12 HOURS SCHEDULED
Status: DISCONTINUED | OUTPATIENT
Start: 2021-03-17 | End: 2021-03-17 | Stop reason: HOSPADM

## 2021-03-17 RX ORDER — NALOXONE HCL 0.4 MG/ML
0.4 VIAL (ML) INJECTION
Status: ACTIVE | OUTPATIENT
Start: 2021-03-17 | End: 2021-03-18

## 2021-03-17 RX ORDER — OXYCODONE HYDROCHLORIDE 5 MG/1
10 TABLET ORAL EVERY 6 HOURS PRN
Status: DISCONTINUED | OUTPATIENT
Start: 2021-03-17 | End: 2021-03-20 | Stop reason: HOSPADM

## 2021-03-17 RX ORDER — PROMETHAZINE HYDROCHLORIDE 25 MG/1
25 TABLET ORAL ONCE AS NEEDED
Status: DISCONTINUED | OUTPATIENT
Start: 2021-03-17 | End: 2021-03-20 | Stop reason: HOSPADM

## 2021-03-17 RX ORDER — OXYCODONE HYDROCHLORIDE 5 MG/1
5 TABLET ORAL EVERY 6 HOURS PRN
Status: DISCONTINUED | OUTPATIENT
Start: 2021-03-17 | End: 2021-03-20 | Stop reason: HOSPADM

## 2021-03-17 RX ORDER — KETOROLAC TROMETHAMINE 30 MG/ML
30 INJECTION, SOLUTION INTRAMUSCULAR; INTRAVENOUS ONCE
Status: COMPLETED | OUTPATIENT
Start: 2021-03-17 | End: 2021-03-17

## 2021-03-17 RX ORDER — SODIUM CHLORIDE, SODIUM LACTATE, POTASSIUM CHLORIDE, CALCIUM CHLORIDE 600; 310; 30; 20 MG/100ML; MG/100ML; MG/100ML; MG/100ML
125 INJECTION, SOLUTION INTRAVENOUS CONTINUOUS
Status: DISCONTINUED | OUTPATIENT
Start: 2021-03-17 | End: 2021-03-20 | Stop reason: HOSPADM

## 2021-03-17 RX ORDER — TRISODIUM CITRATE DIHYDRATE AND CITRIC ACID MONOHYDRATE 500; 334 MG/5ML; MG/5ML
30 SOLUTION ORAL ONCE
Status: COMPLETED | OUTPATIENT
Start: 2021-03-17 | End: 2021-03-17

## 2021-03-17 RX ORDER — ONDANSETRON 4 MG/1
4 TABLET, FILM COATED ORAL EVERY 6 HOURS PRN
Status: DISCONTINUED | OUTPATIENT
Start: 2021-03-17 | End: 2021-03-20 | Stop reason: HOSPADM

## 2021-03-17 RX ORDER — DOCUSATE SODIUM 100 MG/1
100 CAPSULE, LIQUID FILLED ORAL 2 TIMES DAILY PRN
Status: DISCONTINUED | OUTPATIENT
Start: 2021-03-17 | End: 2021-03-20 | Stop reason: HOSPADM

## 2021-03-17 RX ORDER — LANOLIN
CREAM (ML) TOPICAL
Status: DISCONTINUED | OUTPATIENT
Start: 2021-03-17 | End: 2021-03-20 | Stop reason: HOSPADM

## 2021-03-17 RX ORDER — HYDROCORTISONE 25 MG/G
1 CREAM TOPICAL AS NEEDED
Status: DISCONTINUED | OUTPATIENT
Start: 2021-03-17 | End: 2021-03-20 | Stop reason: HOSPADM

## 2021-03-17 RX ORDER — CALCIUM CARBONATE 200(500)MG
1 TABLET,CHEWABLE ORAL EVERY 6 HOURS PRN
Status: DISCONTINUED | OUTPATIENT
Start: 2021-03-17 | End: 2021-03-20 | Stop reason: HOSPADM

## 2021-03-17 RX ORDER — IBUPROFEN 600 MG/1
600 TABLET ORAL EVERY 6 HOURS
Status: DISCONTINUED | OUTPATIENT
Start: 2021-03-18 | End: 2021-03-20 | Stop reason: HOSPADM

## 2021-03-17 RX ORDER — PRENATAL VIT/IRON FUM/FOLIC AC 27MG-0.8MG
1 TABLET ORAL DAILY
Status: DISCONTINUED | OUTPATIENT
Start: 2021-03-17 | End: 2021-03-20 | Stop reason: HOSPADM

## 2021-03-17 RX ORDER — ACETAMINOPHEN 325 MG/1
650 TABLET ORAL EVERY 6 HOURS
Status: DISCONTINUED | OUTPATIENT
Start: 2021-03-18 | End: 2021-03-20 | Stop reason: HOSPADM

## 2021-03-17 RX ORDER — ONDANSETRON 2 MG/ML
4 INJECTION INTRAMUSCULAR; INTRAVENOUS EVERY 6 HOURS PRN
Status: DISCONTINUED | OUTPATIENT
Start: 2021-03-17 | End: 2021-03-20 | Stop reason: HOSPADM

## 2021-03-17 RX ADMIN — ACETAMINOPHEN 1000 MG: 500 TABLET ORAL at 06:45

## 2021-03-17 RX ADMIN — SODIUM CHLORIDE, POTASSIUM CHLORIDE, SODIUM LACTATE AND CALCIUM CHLORIDE 125 ML/HR: 600; 310; 30; 20 INJECTION, SOLUTION INTRAVENOUS at 07:24

## 2021-03-17 RX ADMIN — FENTANYL CITRATE 80 MCG: 50 INJECTION, SOLUTION INTRAMUSCULAR; INTRAVENOUS at 08:21

## 2021-03-17 RX ADMIN — FENTANYL CITRATE 20 MCG: 50 INJECTION, SOLUTION INTRAMUSCULAR; INTRAVENOUS at 07:43

## 2021-03-17 RX ADMIN — KETOROLAC TROMETHAMINE 15 MG: 15 INJECTION, SOLUTION INTRAMUSCULAR; INTRAVENOUS at 22:13

## 2021-03-17 RX ADMIN — SODIUM CHLORIDE, POTASSIUM CHLORIDE, SODIUM LACTATE AND CALCIUM CHLORIDE: 600; 310; 30; 20 INJECTION, SOLUTION INTRAVENOUS at 07:50

## 2021-03-17 RX ADMIN — OXYCODONE 5 MG: 5 TABLET ORAL at 11:46

## 2021-03-17 RX ADMIN — ONDANSETRON 4 MG: 2 INJECTION INTRAMUSCULAR; INTRAVENOUS at 07:37

## 2021-03-17 RX ADMIN — SODIUM CITRATE AND CITRIC ACID MONOHYDRATE 30 ML: 500; 334 SOLUTION ORAL at 07:30

## 2021-03-17 RX ADMIN — MORPHINE SULFATE 0.15 MG: 0.5 INJECTION, SOLUTION EPIDURAL; INTRATHECAL; INTRAVENOUS at 07:43

## 2021-03-17 RX ADMIN — KETOROLAC TROMETHAMINE 30 MG: 30 INJECTION, SOLUTION INTRAMUSCULAR at 09:52

## 2021-03-17 RX ADMIN — Medication 100 MCG: at 07:56

## 2021-03-17 RX ADMIN — KETOROLAC TROMETHAMINE 15 MG: 15 INJECTION, SOLUTION INTRAMUSCULAR; INTRAVENOUS at 16:04

## 2021-03-17 RX ADMIN — SODIUM CHLORIDE, POTASSIUM CHLORIDE, SODIUM LACTATE AND CALCIUM CHLORIDE 500 ML: 600; 310; 30; 20 INJECTION, SOLUTION INTRAVENOUS at 18:22

## 2021-03-17 RX ADMIN — SODIUM CHLORIDE, POTASSIUM CHLORIDE, SODIUM LACTATE AND CALCIUM CHLORIDE 1000 ML/HR: 600; 310; 30; 20 INJECTION, SOLUTION INTRAVENOUS at 06:44

## 2021-03-17 RX ADMIN — Medication 50 MCG: at 07:50

## 2021-03-17 RX ADMIN — ACETAMINOPHEN 1000 MG: 500 TABLET ORAL at 18:21

## 2021-03-17 RX ADMIN — BUPIVACAINE HYDROCHLORIDE IN DEXTROSE 1.4 ML: 7.5 INJECTION, SOLUTION SUBARACHNOID at 07:43

## 2021-03-17 RX ADMIN — MIDAZOLAM 1 MG: 1 INJECTION INTRAMUSCULAR; INTRAVENOUS at 07:36

## 2021-03-17 RX ADMIN — ACETAMINOPHEN 1000 MG: 500 TABLET ORAL at 13:43

## 2021-03-17 RX ADMIN — ONDANSETRON 4 MG: 2 INJECTION INTRAMUSCULAR; INTRAVENOUS at 10:41

## 2021-03-17 RX ADMIN — OXYTOCIN 500 ML: 10 INJECTION INTRAVENOUS at 08:07

## 2021-03-17 RX ADMIN — FAMOTIDINE 20 MG: 10 INJECTION, SOLUTION INTRAVENOUS at 07:37

## 2021-03-17 RX ADMIN — CEFAZOLIN SODIUM 2 G: 2 INJECTION, SOLUTION INTRAVENOUS at 07:29

## 2021-03-17 NOTE — H&P
41-year-old G2, P1 at 39-0 weeks  Patient with a previous  section desires repeat.  Desires tubal sterilization papers signed greater than 30 days ago.  Past medical history: Abnormal Pap smear  Surgeries:   No known drug allergies    Afeb VSS  Ht- RR  Lungs- Clear  Abd- soft nontender  Uterus- appropriate for gestational age     Maternal blood type B+, preop labs reviewed ,Covid negative, HCT 36.4    Repeat  and tubal sterilization at 39 weeks  Consent permit signed    Chart and prenatal record reviewed

## 2021-03-17 NOTE — ANESTHESIA PROCEDURE NOTES
Spinal Block      Patient reassessed immediately prior to procedure    Indication:procedure for pain  Performed By  CRNA: Rosibel Gann CRNA  Preanesthetic Checklist  Completed: patient identified, IV checked, risks and benefits discussed, surgical consent, monitors and equipment checked, pre-op evaluation and timeout performed  Spinal Block Prep:  Patient Position:sitting  Sterile Tech:cap, gloves, mask and sterile barriers  Prep:Betadine  Patient Monitoring:blood pressure monitoring, continuous pulse oximetry and EKG  Spinal Block Procedure  Approach:midline  Guidance:palpation technique  Location:L3-L4  Needle Type:Winnie  Needle Gauge:25 G  Placement of Spinal needle event:cerebrospinal fluid aspirated  Paresthesia: no  Fluid Appearance:clear     Post Assessment  Patient Tolerance:patient tolerated the procedure well with no apparent complications  Complications no

## 2021-03-17 NOTE — ANESTHESIA PREPROCEDURE EVALUATION
Anesthesia Evaluation     Patient summary reviewed and Nursing notes reviewed   NPO Solid Status: > 8 hours  NPO Liquid Status: > 8 hours           Airway   Dental      Pulmonary - negative pulmonary ROS   Cardiovascular - negative cardio ROS        Neuro/Psych- negative ROS  GI/Hepatic/Renal/Endo    (+) obesity,       Musculoskeletal (-) negative ROS    Abdominal    Substance History - negative use     OB/GYN    (+) Pregnant,         Other                        Anesthesia Plan    ASA 2     spinal and ITN       Anesthetic plan, all risks, benefits, and alternatives have been provided, discussed and informed consent has been obtained with: patient.

## 2021-03-17 NOTE — ANESTHESIA POSTPROCEDURE EVALUATION
Patient: Steven Watt    Procedure Summary     Date: 21 Room / [facility] LABOR DELIVERY   AG LABOR DELIVERY    Anesthesia Start: 734 Anesthesia Stop: 851    Procedure:  SECTION REPEAT WITH TUBAL (Bilateral Abdomen) Diagnosis:     Surgeons: Cristopher Cho MD Provider: Verónica Munguia DO    Anesthesia Type: spinal, ITN ASA Status: 2          Anesthesia Type: spinal, ITN    Vitals  Vitals Value Taken Time   /104 21 0855   Temp 98.1    Pulse 86 21 0857   Resp 18    SpO2 98 % 21 0857   Vitals shown include unvalidated device data.        Post Anesthesia Care and Evaluation    Patient location during evaluation: bedside  Patient participation: complete - patient participated  Level of consciousness: awake and alert  Pain score: 0  Pain management: adequate  Airway patency: patent  Anesthetic complications: No anesthetic complications    Cardiovascular status: acceptable  Respiratory status: acceptable  Hydration status: acceptable

## 2021-03-17 NOTE — OP NOTE
Operative Note    Patient name: Steven Watt  YOB: 1980   MRN: [id number]  Admission Date: 3/17/2021  Referring Provider: Cristopher Cho MD    ID: 41 y.o.  at 39w0d    Preoperative Diagnosis:   Patient Active Problem List   Diagnosis   • AMA (advanced maternal age) primigravida 35+   • False labor after 37 weeks of gestation without delivery   • Post-dates pregnancy   • Late prenatal care   • Previous  section   • 38 weeks gestation of pregnancy   • Multigravida of advanced maternal age in third trimester   • Encounter for sterilization   • Previous  delivery affecting pregnancy       Postoperative Diagnosis: Same as above.    Procedure(s): repeatlow transverse  delivery   Tubal sterilization bilateral segmental salpingectomy  Surgeons: Surgeon(s) and Role:     * Cristopher Cho MD - Primary    Staff:  Surgeon(s):  Cristopher Cho MD    Assistant: Sintia Segovia    Anesthesia: Spinal    Estimated Blood Loss: 500 mL mL    IV Fluids: [unfilled]    Preoperative antibiotic: Ancef (cefazolin) 2 grams    Blood products:   Blood Administration Record (From admission, onward)    None          Pathology:   Order Name Source Comment Collection Info Order Time   TISSUE PATHOLOGY EXAM Fallopian Tubes, Bilateral  Collected By: Cristopher Cho MD 3/17/2021  8:22 AM       Drains: Dowling catheter to gravity    Complications: None    Condition: Stable to recovery room      Infant:                Gender: male  infant    Weight: 4095 g (9 lb 0.5 oz)     Apgars: 8  @ 1 minute /     9  @ 5 minutes    Cord gases: Venous:  No components found for:  PHCVEN,  BECVEN      Arterial:  No components found for:  PHCART,  BECART          Operative Summary:   After obtaining informed consent the patient was taken to the operating room where adequate anesthesia was obtained.  Dowling catheter was placed in the bladder preoperatively.  IV antibiotics were given preoperatively.        The abdomen was prepped and draped in the usual sterile fashion for  delivery.  After confirming adequate anesthesia a Pfannenstiel skin incision was made with the scalpel and carried through to the underlying layer of fascia.  The fascia was incised in the midline and the incision extended laterally with the Larson scissors and with blunt dissection.       The upper aspect of the fascia was grasped with 2 Kocher clamps, elevated, and dissected off the underlying rectus muscles bluntly and with the Larson scissors.  The Kocher clamps were removed and applied to the inferior aspect of the fascia.  The fascia was dissected off of the rectus muscles in the same fashion.  The peritoneum was entered bluntly.  The incision was stretched and the bladder blade and Silveira retractor inserted for visualization of the uterus.      The uterus was incised with the scalpel in a low transverse fashion.  The uterine incision was entered digitally and the incision extended bluntly in a cranial-caudal fashion.  Retractors were removed and membranes were ruptured.  The infant was delivered atraumatically from cephalic presentation.  The umbilical cord was clamped and cut and the nose and mouth bulb suctioned.  The infant was handed off to waiting pediatric staff.      Cord blood gases were not collected.  Cord blood was collected.  The placenta was removed using cord traction and uterine massage.  The uterus was exteriorized and cleared of all clots and debris.  The uterine incision was repaired with 1-0 Chromic in a running locked fashion. A single-layer technique was used.  Additional hemostatic measures required: electrocautery and figure-of-eight sutures.    Attention was turned to the tubal sterilization.  Each tube was isolated doubled at midway. Tied with 0 plain suture x2, the knuckles above the ties were cut and sent to the lab for confirming diagnosis.  The fimbriated ends were identified to confirm the  structure.    The incision was inspected and excellent hemostasis was noted.  The tubes and ovaries were noted to be normal. The uterus was returned to the abdomen.  The gutters were cleared of all clots and debris.  Irrigation was used.  The uterine incision was again inspected and found to be hemostatic.      The peritoneum was reapproximated with 2.0 Vicryl .  The fascia was closed with 0 Vicryl  in a running fashion (two segments).  The subcutaneous space was reapproximated using 3.0 Plain.      The skin was closed using staples.  The patient was transferred to the recovery room in stable condition.            Cristopher Cho MD  3/17/2021  08:53 EDT

## 2021-03-17 NOTE — PAYOR COMM NOTE
"Steven Watt (41 y.o. Female)     [employer] ID#[id number]    Delivery information.    From:Sheryl Edwards  #[phone]  Fax#[phone]      Date of Birth Social Security Number Address Home Phone MRN    1980  [address] [place] [address] [phone] [id number]    Tenriism Marital Status          None Single       Admission Date Admission Type Admitting Provider Attending Provider Department, Room/Bed    3/17/21 Elective Cristopher Cho MD Owen, Randal Wilson, MD [facility] MOTHER BABY 4A, N419/1    Discharge Date Discharge Disposition Discharge Destination                       Attending Provider: Cristopher Cho MD    Allergies: No Known Allergies    Isolation: None   Infection: None   Code Status: CPR    Ht: 159 cm (62.6\")   Wt: 97.1 kg (214 lb)    Admission Cmt: None   Principal Problem: None                Active Insurance as of 3/17/2021     Primary Coverage     Payor Plan Insurance Group Employer/Plan Group    ANTHEM BLUE CROSS ANTHEM BLUE CROSS BLUE SHIELD PPO [id number]     Payor Plan Address Payor Plan Phone Number Payor Plan Fax Number Effective Dates    [address] [phone]  1/1/2020 - None Entered    [facility] [id number]       Subscriber Name Subscriber Birth Date Member ID       STEVEN WATT 1980 [id number]           Secondary Coverage     Payor Plan Insurance Group Employer/Plan Group    [employer] MEDICAID      Payor Plan Address Payor Plan Phone Number Payor Plan Fax Number Effective Dates    [address] [address] [phone]  2/3/2021 - None Entered    [facility] [id number]       Subscriber Name Subscriber Birth Date Member ID       STEVEN WATT 1980 [id number]                 Emergency Contacts      (Rel.) Home Phone Work Phone Mobile Phone    Betsy Watt (Sister) [phone] -- --            Insurance Information                ANTHEM BLUE CROSS/ANTHEM BLUE CROSS BLUE SHIELD PPO Phone: [phone]    " Subscriber: Steven Watt Subscriber#: [id number]    Group#: [id number] Precert#:         [facility]/[employer] MEDICA[employer] Phone: [phone]    Subscriber: Steven Watt Subscriber#: [id number]    Group#:  Precert#:           Problem List         Codes Noted - Resolved       Hospital    Previous  delivery affecting pregnancy ICD-10-CM: O34.219  ICD-9-CM: 654.20 3/17/2021 - Present       Non-Hospital    Encounter for sterilization ICD-10-CM: Z30.2  ICD-9-CM: V25.2 3/15/2021 - Present    38 weeks gestation of pregnancy ICD-10-CM: Z3A.38  ICD-9-CM: V22.2 3/10/2021 - Present    Multigravida of advanced maternal age in third trimester ICD-10-CM: O09.523  ICD-9-CM: 659.63 3/10/2021 - Present    Previous  section ICD-10-CM: Z98.891  ICD-9-CM: V45.89 2021 - Present    Late prenatal care ICD-10-CM: O09.30  ICD-9-CM: V23.7 10/28/2020 - Present    Post-dates pregnancy ICD-10-CM: O48.0  ICD-9-CM: 645.10 1/3/2018 - Present    False labor after 37 weeks of gestation without delivery ICD-10-CM: O47.1  ICD-9-CM: 644.10 2017 - Present    AMA (advanced maternal age) primigravida 35+ ICD-10-CM: O09.519  ICD-9-CM: 659.50 2017 - Present             History & Physical      Cristopher Cho MD at 21 0728        41-year-old G2, P1 at 39-0 weeks  Patient with a previous  section desires repeat.  Desires tubal sterilization papers signed greater than 30 days ago.  Past medical history: Abnormal Pap smear  Surgeries:   No known drug allergies    Afeb VSS  Ht- RR  Lungs- Clear  Abd- soft nontender  Uterus- appropriate for gestational age     Maternal blood type B+, preop labs reviewed ,Covid negative, HCT 36.4    Repeat  and tubal sterilization at 39 weeks  Consent permit signed    Chart and prenatal record reviewed    Electronically signed by Cristopher Cho MD at 21 0734         Facility-Administered Medications as of 3/17/2021   Medication Dose Route  Frequency Provider Last Rate Last Admin   • [COMPLETED] acetaminophen (TYLENOL) tablet 1,000 mg  1,000 mg Oral Once Cristopher Cho MD   1,000 mg at 03/17/21 0645   • acetaminophen (TYLENOL) tablet 1,000 mg  1,000 mg Oral Q6H Cristopher Cho MD        Followed by   • [START ON 3/18/2021] acetaminophen (TYLENOL) tablet 650 mg  650 mg Oral Q6H Cristopher Cho MD       • calcium carbonate (TUMS) chewable tablet 500 mg (200 mg elemental)  1 tablet Oral Q6H PRN Cristopher Cho MD       • [COMPLETED] ceFAZolin in dextrose (ANCEF) IVPB solution 2 g  2 g Intravenous Once Cristopher Cho MD   2 g at 03/17/21 0729   • diphenhydrAMINE (BENADRYL) capsule 25 mg  25 mg Oral Q4H PRN Rosibel Gann CRNA        Or   • diphenhydrAMINE (BENADRYL) injection 25 mg  25 mg Intravenous Q4H PRN Rosibel Gann CRNA        Or   • diphenhydrAMINE (BENADRYL) injection 25 mg  25 mg Intramuscular Q4H PRN Rosibel Gann CRNA       • diphenhydrAMINE (BENADRYL) capsule 25 mg  25 mg Oral Q4H PRN Cristopher Cho MD       • docusate sodium (COLACE) capsule 100 mg  100 mg Oral BID PRN Cristopher Cho MD       • fentaNYL citrate (PF) (SUBLIMAZE) injection 50 mcg  50 mcg Intravenous Q5 Min PRN Rosibel Gann CRNA       • Hydrocortisone (Perianal) (ANUSOL-HC) 2.5 % rectal cream 1 application  1 application Rectal PRN Cristopher Cho MD       • ketorolac (TORADOL) injection 15 mg  15 mg Intravenous Q6H Cristopher Cho MD        Followed by   • [START ON 3/18/2021] ibuprofen (ADVIL,MOTRIN) tablet 600 mg  600 mg Oral Q6H Cristopher Cho MD       • [COMPLETED] ketorolac (TORADOL) injection 30 mg  30 mg Intravenous Once Cristopher Cho MD   30 mg at 03/17/21 0952   • lactated ringers infusion  125 mL/hr Intravenous Continuous Cristopher Cho  mL/hr at 03/17/21 0724 New Bag at 03/17/21 0750   • lanolin cream   Topical Q1H PRN Cristopher Cho MD       • metoclopramide (REGLAN) tablet 10 mg  10 mg  Oral Once PRN Cristopher Cho MD       • naloxone (NARCAN) injection 0.4 mg  0.4 mg Intravenous Q1H PRN Rosibel Gann CRNA       • ondansetron (ZOFRAN) injection 4 mg  4 mg Intravenous Once PRN Rosibel Gann CRNA       • ondansetron (ZOFRAN) tablet 4 mg  4 mg Oral Q6H PRN Crsitopher Cho MD        Or   • ondansetron (ZOFRAN) injection 4 mg  4 mg Intravenous Q6H PRN Cristopher Cho MD   4 mg at 03/17/21 1041   • oxyCODONE (ROXICODONE) immediate release tablet 5 mg  5 mg Oral Q6H PRN Cristopher Cho MD        Or   • oxyCODONE (ROXICODONE) immediate release tablet 10 mg  10 mg Oral Q6H PRN Cristopher Cho MD       • prenatal vitamin tablet 1 tablet  1 tablet Oral Daily Cristopher Cho MD       • promethazine (PHENERGAN) tablet 25 mg  25 mg Oral Once PRN Cristopher Cho MD        Or   • promethazine (PHENERGAN) suppository 12.5 mg  12.5 mg Rectal Once PRN Cristopher Cho MD       • simethicone (MYLICON) chewable tablet 80 mg  80 mg Oral 4x Daily PRN Cristopher Cho MD       • [COMPLETED] Sod Citrate-Citric Acid (BICITRA) solution 30 mL  30 mL Oral Once Cristopher Cho MD   30 mL at 03/17/21 0730       Lab Results (last 24 hours)     ** No results found for the last 24 hours. **        Orders (last 24 hrs)      Start     Ordered    03/18/21 1600  ibuprofen (ADVIL,MOTRIN) tablet 600 mg  Every 6 Hours      03/17/21 1022    03/18/21 1300  acetaminophen (TYLENOL) tablet 650 mg  Every 6 Hours      03/17/21 0912    03/18/21 0600  Discontinue Indwelling Urinary Catheter in AM  Once      03/17/21 1022    03/18/21 0600  CBC & Differential  Timed      03/17/21 1022    03/18/21 0000  Remove Abdominal Dressing  Once      03/17/21 1022    03/17/21 1800  Ambulate Patient  2 Times Daily     Comments: After anesthesia wears off.    03/17/21 1022    03/17/21 1600  ketorolac (TORADOL) injection 15 mg  Every 6 Hours      03/17/21 1022    03/17/21 1300  acetaminophen (TYLENOL) tablet 1,000  mg  Every 6 Hours      03/17/21 0912    03/17/21 1115  prenatal vitamin tablet 1 tablet  Daily      03/17/21 1022    03/17/21 1100  Incentive spirometry RT  Every Hour      03/17/21 1022    03/17/21 1023  Vital Signs  Per Hospital Policy      03/17/21 1022    03/17/21 1023  IV Site Care  Continuous      03/17/21 1022    03/17/21 1023  Oxygen Therapy- Nasal Cannula; 2 LPM; Titrate for SPO2: equal to or greater than, per policy  Continuous      03/17/21 1022    03/17/21 1023  Respirations  Continuous     Comments: If respiratory rate is less than 10/min, notify the Anesthesiologist    03/17/21 1022    03/17/21 1023  If respiratory is less than 8/min, see Narcan order below. Notify Anesthesiologist STAT.  Continuous      03/17/21 1022    03/17/21 1023  Blood Pressure and Pulse every 4 hours  Continuous      03/17/21 1022    03/17/21 1023  Activity and removal of dunlap catheter, per Obstetrician, on routine post-operative orders  Continuous      03/17/21 1022    03/17/21 1023  Notify Anesthesiologist for any questions/problems  Continuous      03/17/21 1022    03/17/21 1023  Notify Anesthesia for temp over 101.4F  Continuous      03/17/21 1022    03/17/21 1023  Code Status and Medical Interventions:  Continuous      03/17/21 1022    03/17/21 1023  Vital Signs Per hospital policy  Per Hospital Policy      03/17/21 1022    03/17/21 1023  Notify Physician  Until Discontinued      03/17/21 1022    03/17/21 1023  Patient May Shower  Once     Comments: After anesthesia wears off and with assistance    03/17/21 1022    03/17/21 1023  Diet Regular  Diet Effective Now,   Status:  Canceled      03/17/21 1022    03/17/21 1023  Advance Diet as Tolerated  Until Discontinued      03/17/21 1022    03/17/21 1023  I/O  Every Shift      03/17/21 1022    03/17/21 1023  Fundal and Lochia Check  Per Hospital Policy     Comments: Q 30 min x 2, Q 1 hr x 4, Q 4 hrs x 24 hrs, then Q shift.    03/17/21 1022    03/17/21 1023  Weigh Patient  Once   "    03/17/21 1022    03/17/21 1023  Continue Indwelling Urinary Catheter Already in Place  Once      03/17/21 1022    03/17/21 1023  Notify Provider if Bladder Distention Continues  Until Discontinued      03/17/21 1022    03/17/21 1023  Urinary Catheter Care  Every Shift      03/17/21 1022    03/17/21 1023  Turn Cough Deep Breathe  Once      03/17/21 1022    03/17/21 1023  Encourage early intake of PO fluids  Continuous      03/17/21 1022    03/17/21 1023  Ambulate Patient 3-5 times per day (with or without Dowling)  Every Shift      03/17/21 1022    03/17/21 1023  Apply Abdominal Binding Until Discontinued  Until Discontinued      03/17/21 1022    03/17/21 1023  \"If patient tolerates food and liquids after completion of second bag of Pitocin, saline lock IV and discontinue IV fluid infusions.  Once      03/17/21 1022    03/17/21 1023  Breast pump to bed  Once      03/17/21 1022    03/17/21 1023  If indicated -- Please administer RH Immunoglobulin based on results of cord blood evaluation and fetal screen lab tests, pharmacy to dispense  Continuous     Comments: See process instructions for reference range details.    03/17/21 1022    03/17/21 1023  Place Sequential Compression Device  Once      03/17/21 1022    03/17/21 1023  Maintain Sequential Compression Device  Continuous      03/17/21 1022    03/17/21 1023  Diet Regular  Diet Effective Now      03/17/21 1022    03/17/21 1023  Fundal & Lochia Check  Per Order Details     Comments: Every 15 Minutes x4, Then Every 30 Minutes x2, Then Every Shift    03/17/21 1022    03/17/21 1023  Notify Provider (Specified)  Until Discontinued      03/17/21 1022    03/17/21 1023  Strict Bed Rest  Until Discontinued      03/17/21 1022    03/17/21 1023  Vital Signs Per Hospital Policy  Per Hospital Policy      03/17/21 1022    03/17/21 1022  Fundal & Lochia Check  Every Shift      03/17/21 1022    03/17/21 1022  fentaNYL citrate (PF) (SUBLIMAZE) injection 50 mcg  Every 5 Minutes PRN "      03/17/21 1022    03/17/21 1022  ondansetron (ZOFRAN) injection 4 mg  Once As Needed      03/17/21 1022    03/17/21 1022  diphenhydrAMINE (BENADRYL) capsule 25 mg  Every 4 Hours PRN      03/17/21 1022    03/17/21 1022  diphenhydrAMINE (BENADRYL) injection 25 mg  Every 4 Hours PRN      03/17/21 1022    03/17/21 1022  diphenhydrAMINE (BENADRYL) injection 25 mg  Every 4 Hours PRN      03/17/21 1022    03/17/21 1022  naloxone (NARCAN) injection 0.4 mg  Every 1 Hour PRN      03/17/21 1022    03/17/21 1022  oxyCODONE (ROXICODONE) immediate release tablet 5 mg  Every 6 Hours PRN      03/17/21 1022    03/17/21 1022  oxyCODONE (ROXICODONE) immediate release tablet 10 mg  Every 6 Hours PRN      03/17/21 1022    03/17/21 1022  simethicone (MYLICON) chewable tablet 80 mg  4 Times Daily PRN      03/17/21 1022    03/17/21 1022  ondansetron (ZOFRAN) tablet 4 mg  Every 6 Hours PRN      03/17/21 1022    03/17/21 1022  ondansetron (ZOFRAN) injection 4 mg  Every 6 Hours PRN      03/17/21 1022    03/17/21 1022  promethazine (PHENERGAN) tablet 25 mg  Once As Needed      03/17/21 1022    03/17/21 1022  promethazine (PHENERGAN) suppository 12.5 mg  Once As Needed      03/17/21 1022    03/17/21 1022  docusate sodium (COLACE) capsule 100 mg  2 Times Daily PRN      03/17/21 1022    03/17/21 1022  metoclopramide (REGLAN) tablet 10 mg  Once As Needed      03/17/21 1022    03/17/21 1022  lanolin cream  Every 1 Hour PRN      03/17/21 1022    03/17/21 1022  Hydrocortisone (Perianal) (ANUSOL-HC) 2.5 % rectal cream 1 application  As Needed      03/17/21 1022    03/17/21 1022  diphenhydrAMINE (BENADRYL) capsule 25 mg  Every 4 Hours PRN      03/17/21 1022    03/17/21 1022  calcium carbonate (TUMS) chewable tablet 500 mg (200 mg elemental)  Every 6 Hours PRN      03/17/21 1022    03/17/21 1000  oxytocin in sodium chloride (PITOCIN) 30 UNIT/500ML infusion solution  Once,   Status:  Discontinued      03/17/21 0912    03/17/21 1000  oxytocin in  sodium chloride (PITOCIN) 30 UNIT/500ML infusion solution  Once,   Status:  Discontinued      21 0912    21 1000  ketorolac (TORADOL) injection 30 mg  Once      21 0912    21 0900  sodium chloride 0.9 % flush 3 mL  Every 12 Hours Scheduled,   Status:  Discontinued      21 0616    21 0822  Tissue Pathology Exam  RELEASE UPON ORDERING      21 0822    21 0715  lactated ringers infusion  Continuous      21 0616    21 0715  acetaminophen (TYLENOL) tablet 1,000 mg  Once      21 0616    21 0715  Sod Citrate-Citric Acid (BICITRA) solution 30 mL  Once      21 0616    21 0715  ceFAZolin in dextrose (ANCEF) IVPB solution 2 g  Once      21 0616    21 0617  Admit To Obstetrics Inpatient  Once      21 0616    21 0617  Code Status and Medical Interventions:  Continuous,   Status:  Canceled      21 0616    21 0617  Insert Peripheral IV  Once      21 0616    21 0617  Saline Lock & Maintain IV Access  Continuous      21 0616    21 0617  Abdominal Prep with Clippers  Once      21 0616    21 0617  Continuous Fetal Monitoring With NST on Admission and Prior to Initiation of Oxytocin.  Per Order Details     Comments: Continuous Fetal Monitoring With NST on Admission & Prior to Initiation of Oxytocin.    21 0616    21 0617  Document Gatorade Consumption Prior to Admission (Yes or No)  Once      21 0616    21 0617  External Uterine Contraction Monitoring  Per Hospital Policy      21 0616    21 0617  Initiate Group Beta Strep (GBS) Prophylaxis Protocol, If Criteria Met  Continuous     Comments: NO TREATMENT RECOMMENDED IF: 1) Maternal GBS Status Known Negative 2) Scheduled  Birth With Intact Membranes, Not in Labor 3) Maternal GBS Status Unknown, No Risk Factors  TREAT WITH ANTIBIOTICS IF:  1) Maternal GBS Status Known Positive 2) Maternal GBS  Status Unknown With Risk Factors: a)  Previous Infant Affected By GBS Infection b) GBS Urinary Tract Infection (UTI) or Bacteriuria During Pregnancy c) Unexplained Maternal Fever (100.4F (38C) or Greater) During Labor d)  Prolonged Rupture of Membranes (18 or More Hours) e) Gestational Age Less Than 37 Weeks    03/17/21 0616    03/17/21 0617  Notify Provider (Specified)  Until Discontinued      03/17/21 0616    03/17/21 0617  Notify Provider if Membranes Ruptured, Bleeding Greater Than 1 Pad Per Hour, Fetal Heart Tone Abnormality or Severe Pain  Until Discontinued      03/17/21 0616    03/17/21 0617  Notify Provider of Tachysystole (Per Hospital Algorithm)  Until Discontinued      03/17/21 0616    03/17/21 0617  NPO Diet  Diet Effective Now,   Status:  Canceled      03/17/21 0616    03/17/21 0617  NPO Diet NPO Except: Ice Chips  Diet Effective Now,   Status:  Canceled      03/17/21 0616    03/17/21 0617  POC Glucose Once  Once      03/17/21 0616    03/17/21 0617  SCD (sequential compression devices)  Once      03/17/21 0616    03/17/21 0617  Vital Signs Per Hospital Policy  Per Hospital Policy      03/17/21 0616    03/17/21 0616  lidocaine PF 1% (XYLOCAINE) injection 5 mL  As Needed,   Status:  Discontinued      03/17/21 0616    03/17/21 0616  sodium chloride 0.9 % flush 10 mL  As Needed,   Status:  Discontinued      03/17/21 0616    Unscheduled  Blood Gas, Arterial -With Co-Ox Panel: Yes  As Needed      03/17/21 1022    Unscheduled  Up with Assistance  As Needed      03/17/21 1022    Unscheduled  Bladder Scan if Patient Unable to Void 4-6 Hours After Catheter Removal  As Needed      03/17/21 1022    Unscheduled  Straight Cath Every 4-6 Hours As Needed If Patient is Unable to Void After 4-6 Hours, Bladder Scan Volume is Greater Than 500mL & Patient Has Symptoms of Bladder Discomfort / Distention  As Needed      03/17/21 1022    Unscheduled  Consult Pharmacist For Review of Medications That May Cause Urinary  Retention - RN To Place Order for Consult it Needed  As Needed      21 1022    Unscheduled  Schedule / Prompt Voiding For Patients With Urinary Incontinence  As Needed      21 1022    Unscheduled  Abdominal Wound Care  As Needed     Comments: Postop day 1. Remove dressing and leave incision open to air.    21 1022    Unscheduled  Chewing Gum  As Needed      21 1022    Unscheduled  Warm compress  As Needed      21 1022    Unscheduled  Apply ace wrap, tight bra, or binder  As Needed      21 1022    Unscheduled  Apply ice packs  As Needed      21 1022                   Operative/Procedure Notes (last 24 hours) (Notes from 21 1116 through 21 1116)      Cristopher Cho MD at 21 0856      Summary: See  op note      See  op note    Electronically signed by Cristopher Cho MD at 21 0914     Cristopher Cho MD at 21 0756          Operative Note    Patient name: Steven Watt  YOB: 1980   MRN: [id number]  Admission Date: 3/17/2021  Referring Provider: Cristopher Cho MD    ID: 41 y.o.  at [id number]    Preoperative Diagnosis:   Patient Active Problem List   Diagnosis   • AMA (advanced maternal age) primigravida 35+   • False labor after 37 weeks of gestation without delivery   • Post-dates pregnancy   • Late prenatal care   • Previous  section   • 38 weeks gestation of pregnancy   • Multigravida of advanced maternal age in third trimester   • Encounter for sterilization   • Previous  delivery affecting pregnancy       Postoperative Diagnosis: Same as above.    Procedure(s): repeatlow transverse  delivery   Tubal sterilization bilateral segmental salpingectomy  Surgeons: Surgeon(s) and Role:     * Cristopher Cho MD - Primary    Staff:  Surgeon(s):  Cristopher Cho MD    Assistant: Sintia Segovia    Anesthesia: Spinal    Estimated Blood Loss: 500 mL mL    IV Fluids:  [unfilled]    Preoperative antibiotic: Ancef (cefazolin) 2 grams    Blood products:   Blood Administration Record (From admission, onward)    None          Pathology:   Order Name Source Comment Collection Info Order Time   TISSUE PATHOLOGY EXAM Fallopian Tubes, Bilateral  Collected By: Cristopher Cho MD 3/17/2021  8:22 AM       Drains: Dowling catheter to gravity    Complications: None    Condition: Stable to recovery room      Infant:                Gender: male  infant    Weight: 4095 g (9 lb 0.5 oz)     Apgars: 8  @ 1 minute /     9  @ 5 minutes    Cord gases: Venous:  No components found for:  PHCVEN,  BECVEN      Arterial:  No components found for:  PHCART,  BECART          Operative Summary:   After obtaining informed consent the patient was taken to the operating room where adequate anesthesia was obtained.  Dowling catheter was placed in the bladder preoperatively.  IV antibiotics were given preoperatively.       The abdomen was prepped and draped in the usual sterile fashion for  delivery.  After confirming adequate anesthesia a Pfannenstiel skin incision was made with the scalpel and carried through to the underlying layer of fascia.  The fascia was incised in the midline and the incision extended laterally with the Larson scissors and with blunt dissection.       The upper aspect of the fascia was grasped with 2 Kocher clamps, elevated, and dissected off the underlying rectus muscles bluntly and with the Larson scissors.  The Kocher clamps were removed and applied to the inferior aspect of the fascia.  The fascia was dissected off of the rectus muscles in the same fashion.  The peritoneum was entered bluntly.  The incision was stretched and the bladder blade and Sivleira retractor inserted for visualization of the uterus.      The uterus was incised with the scalpel in a low transverse fashion.  The uterine incision was entered digitally and the incision extended bluntly in a cranial-caudal fashion.   Retractors were removed and membranes were ruptured.  The infant was delivered atraumatically from cephalic presentation.  The umbilical cord was clamped and cut and the nose and mouth bulb suctioned.  The infant was handed off to waiting pediatric staff.      Cord blood gases were not collected.  Cord blood was collected.  The placenta was removed using cord traction and uterine massage.  The uterus was exteriorized and cleared of all clots and debris.  The uterine incision was repaired with 1-0 Chromic in a running locked fashion. A single-layer technique was used.  Additional hemostatic measures required: electrocautery and figure-of-eight sutures.    Attention was turned to the tubal sterilization.  Each tube was isolated doubled at midway. Tied with 0 plain suture x2, the knuckles above the ties were cut and sent to the lab for confirming diagnosis.  The fimbriated ends were identified to confirm the structure.    The incision was inspected and excellent hemostasis was noted.  The tubes and ovaries were noted to be normal. The uterus was returned to the abdomen.  The gutters were cleared of all clots and debris.  Irrigation was used.  The uterine incision was again inspected and found to be hemostatic.      The peritoneum was reapproximated with 2.0 Vicryl .  The fascia was closed with 0 Vicryl  in a running fashion (two segments).  The subcutaneous space was reapproximated using 3.0 Plain.      The skin was closed using staples.  The patient was transferred to the recovery room in stable condition.            Cristopher Cho MD  3/17/2021  08:53 EDT      Electronically signed by Cristopher Cho MD at 03/17/21 0856       Physician Progress Notes (last 24 hours) (Notes from 03/16/21 1116 through 03/17/21 1116)    No notes of this type exist for this encounter.

## 2021-03-18 LAB
BASOPHILS # BLD AUTO: 0.01 10*3/MM3 (ref 0–0.2)
BASOPHILS NFR BLD AUTO: 0.1 % (ref 0–1.5)
CYTO UR: NORMAL
DEPRECATED RDW RBC AUTO: 46.6 FL (ref 37–54)
EOSINOPHIL # BLD AUTO: 0.06 10*3/MM3 (ref 0–0.4)
EOSINOPHIL NFR BLD AUTO: 0.9 % (ref 0.3–6.2)
ERYTHROCYTE [DISTWIDTH] IN BLOOD BY AUTOMATED COUNT: 12.9 % (ref 12.3–15.4)
HCT VFR BLD AUTO: 31.3 % (ref 34–46.6)
HGB BLD-MCNC: 10.1 G/DL (ref 12–15.9)
IMM GRANULOCYTES # BLD AUTO: 0.03 10*3/MM3 (ref 0–0.05)
IMM GRANULOCYTES NFR BLD AUTO: 0.4 % (ref 0–0.5)
LAB AP CASE REPORT: NORMAL
LAB AP CLINICAL INFORMATION: NORMAL
LYMPHOCYTES # BLD AUTO: 1.55 10*3/MM3 (ref 0.7–3.1)
LYMPHOCYTES NFR BLD AUTO: 22.4 % (ref 19.6–45.3)
MCH RBC QN AUTO: 32.5 PG (ref 26.6–33)
MCHC RBC AUTO-ENTMCNC: 32.3 G/DL (ref 31.5–35.7)
MCV RBC AUTO: 100.6 FL (ref 79–97)
MONOCYTES # BLD AUTO: 0.35 10*3/MM3 (ref 0.1–0.9)
MONOCYTES NFR BLD AUTO: 5.1 % (ref 5–12)
NEUTROPHILS NFR BLD AUTO: 4.93 10*3/MM3 (ref 1.7–7)
NEUTROPHILS NFR BLD AUTO: 71.1 % (ref 42.7–76)
NRBC BLD AUTO-RTO: 0 /100 WBC (ref 0–0.2)
PATH REPORT.FINAL DX SPEC: NORMAL
PATH REPORT.GROSS SPEC: NORMAL
PLATELET # BLD AUTO: 234 10*3/MM3 (ref 140–450)
PMV BLD AUTO: 9.3 FL (ref 6–12)
RBC # BLD AUTO: 3.11 10*6/MM3 (ref 3.77–5.28)
WBC # BLD AUTO: 6.93 10*3/MM3 (ref 3.4–10.8)

## 2021-03-18 PROCEDURE — 0503F POSTPARTUM CARE VISIT: CPT | Performed by: NURSE PRACTITIONER

## 2021-03-18 PROCEDURE — 25010000002 KETOROLAC TROMETHAMINE PER 15 MG: Performed by: OBSTETRICS & GYNECOLOGY

## 2021-03-18 PROCEDURE — 85025 COMPLETE CBC W/AUTO DIFF WBC: CPT | Performed by: OBSTETRICS & GYNECOLOGY

## 2021-03-18 RX ADMIN — ACETAMINOPHEN 650 MG: 325 TABLET ORAL at 18:44

## 2021-03-18 RX ADMIN — KETOROLAC TROMETHAMINE 15 MG: 15 INJECTION, SOLUTION INTRAMUSCULAR; INTRAVENOUS at 10:11

## 2021-03-18 RX ADMIN — ACETAMINOPHEN 1000 MG: 500 TABLET ORAL at 06:18

## 2021-03-18 RX ADMIN — PRENATAL VITAMINS-IRON FUMARATE 27 MG IRON-FOLIC ACID 0.8 MG TABLET 1 TABLET: at 08:42

## 2021-03-18 RX ADMIN — IBUPROFEN 600 MG: 600 TABLET, FILM COATED ORAL at 16:05

## 2021-03-18 RX ADMIN — IBUPROFEN 600 MG: 600 TABLET, FILM COATED ORAL at 21:09

## 2021-03-18 RX ADMIN — ACETAMINOPHEN 1000 MG: 500 TABLET ORAL at 01:08

## 2021-03-18 RX ADMIN — KETOROLAC TROMETHAMINE 15 MG: 15 INJECTION, SOLUTION INTRAMUSCULAR; INTRAVENOUS at 03:58

## 2021-03-18 NOTE — PLAN OF CARE
Goal Outcome Evaluation:  Plan of Care Reviewed With: patient     Outcome Summary: VSS; up ad morgan; voiding; incision dried drainage on adhesive bandage; bottlefeeding infant; pain well controlled with ERAS medications; will continue to monitor.

## 2021-03-18 NOTE — ANESTHESIA POSTPROCEDURE EVALUATION
Patient: Steven Watt    Procedure Summary     Date: 21 Room / Location: [facility] LABOR DELIVERY   [facility] LABOR DELIVERY    Anesthesia Start: 734 Anesthesia Stop: 851    Procedure:  SECTION REPEAT WITH TUBAL (Bilateral Abdomen) Diagnosis:     Surgeons: Cristopher Cho MD Provider: Verónica Munguia DO    Anesthesia Type: spinal, ITN ASA Status: 2          Anesthesia Type: spinal, ITN    Vitals  Vitals Value Taken Time   /58 21 1120   Temp 99.3 °F (37.4 °C) 21 1120   Pulse 85 21 1120   Resp 16 21 1120   SpO2 97 % 21 0955   Vitals shown include unvalidated device data.        Post Anesthesia Care and Evaluation    Patient location during evaluation: bedside  Patient participation: complete - patient participated  Level of consciousness: awake and alert  Pain management: adequate  Airway patency: patent  Anesthetic complications: No anesthetic complications    Cardiovascular status: acceptable  Respiratory status: acceptable  Hydration status: acceptable  Post Neuraxial Block status: Motor and sensory function returned to baseline and No signs or symptoms of PDPH

## 2021-03-18 NOTE — PROGRESS NOTES
3/18/2021    Name:Steven Watt    MR#:[id number]     PROGRESS NOTE:  Post-Op 1 S/P    HD:1    Subjective   41 y.o. yo Female  s/p CS at 39w0d doing well. Pain well controlled. Tolerating regular diet and having flatus. Lochia normal.      Patient Active Problem List   Diagnosis   • AMA (advanced maternal age) primigravida 35+   • False labor after 37 weeks of gestation without delivery   • Post-dates pregnancy   • Late prenatal care   • Previous  section   • 38 weeks gestation of pregnancy   • Multigravida of advanced maternal age in third trimester   • Encounter for sterilization   • Previous  delivery affecting pregnancy        Objective    Vitals  Temp:  Temp:  [97.6 °F (36.4 °C)-99.1 °F (37.3 °C)] 98.9 °F (37.2 °C)  Temp src: Oral  BP:  BP: ()/(52-71) 98/54  Pulse:  Heart Rate:  [63-90] 80  RR:   Resp:  [16-18] 16    General Awake, alert, no distress  Abdomen Soft, non-distended, fundus firm, below umbilicus, appropriately tender  Incision  Intact, no erythema or exudate  Extremities Calves NT bilaterally     I/O last 3 completed shifts:  In: 3300 [I.V.:3300]  Out: 3008 [Urine:2220; Emesis/NG output:400; Blood:388]    LABS:   Lab Results   Component Value Date    WBC 6.06 03/15/2021    HGB 11.9 (L) 03/15/2021    HCT 36.4 03/15/2021    MCV 98.4 (H) 03/15/2021     03/15/2021       Infant: male       Assessment   1.  POD 1; am labs pending   2.  Baby boy well; desires circ    Plan:  Routine postoperative care      Active Problems:   None      Margie Hernandez, APRN  3/18/2021 08:49 EDT

## 2021-03-19 PROCEDURE — 0503F POSTPARTUM CARE VISIT: CPT | Performed by: NURSE PRACTITIONER

## 2021-03-19 RX ADMIN — IBUPROFEN 600 MG: 600 TABLET, FILM COATED ORAL at 15:56

## 2021-03-19 RX ADMIN — IBUPROFEN 600 MG: 600 TABLET, FILM COATED ORAL at 10:24

## 2021-03-19 RX ADMIN — IBUPROFEN 600 MG: 600 TABLET, FILM COATED ORAL at 05:37

## 2021-03-19 RX ADMIN — ACETAMINOPHEN 650 MG: 325 TABLET ORAL at 12:47

## 2021-03-19 RX ADMIN — PRENATAL VITAMINS-IRON FUMARATE 27 MG IRON-FOLIC ACID 0.8 MG TABLET 1 TABLET: at 10:23

## 2021-03-19 RX ADMIN — ACETAMINOPHEN 650 MG: 325 TABLET ORAL at 06:48

## 2021-03-19 RX ADMIN — ACETAMINOPHEN 650 MG: 325 TABLET ORAL at 19:05

## 2021-03-19 RX ADMIN — ACETAMINOPHEN 650 MG: 325 TABLET ORAL at 00:50

## 2021-03-19 RX ADMIN — IBUPROFEN 600 MG: 600 TABLET, FILM COATED ORAL at 22:18

## 2021-03-19 NOTE — PROGRESS NOTES
3/19/2021    Name:Steven Watt    MR#:[id number]     PROGRESS NOTE:  Post-Op 2 S/P    HD:2    Subjective   41 y.o. yo Female  s/p CS at 39w0d doing well. Pain well controlled. Tolerating regular diet and having flatus. Lochia normal.     Patient Active Problem List   Diagnosis   • AMA (advanced maternal age) primigravida 35+   • False labor after 37 weeks of gestation without delivery   • Post-dates pregnancy   • Late prenatal care   • Previous  section   • 38 weeks gestation of pregnancy   • Multigravida of advanced maternal age in third trimester   • Encounter for sterilization   • Previous  delivery affecting pregnancy        Objective    Vitals  Temp:  Temp:  [98.1 °F (36.7 °C)-99.3 °F (37.4 °C)] 98.1 °F (36.7 °C)  Temp src: Oral  BP:  BP: (111-118)/(58-70) 118/66  Pulse:  Heart Rate:  [81-95] 81  RR:   Resp:  [16] 16    General Awake, alert, no distress  Abdomen Soft, non-distended, fundus firm, below umbilicus, appropriately tender  Incision  Intact, no erythema or exudate  Extremities Calves NT bilaterally     I/O last 3 completed shifts:  In: -   Out: 3950 [Urine:3950]    LABS:   Lab Results   Component Value Date    WBC 6.93 2021    HGB 10.1 (L) 2021    HCT 31.3 (L) 2021    .6 (H) 2021     2021       Infant: male       Assessment   1.  POD 2; doing well   2.  Baby boy well; desires circ    Plan:   Routine postoperative care      Active Problems:   None      Margie Hernandez, APRN  3/19/2021 09:31 EDT

## 2021-03-20 VITALS
TEMPERATURE: 98.6 F | DIASTOLIC BLOOD PRESSURE: 71 MMHG | SYSTOLIC BLOOD PRESSURE: 124 MMHG | RESPIRATION RATE: 18 BRPM | OXYGEN SATURATION: 98 % | HEART RATE: 78 BPM

## 2021-03-20 PROBLEM — Z3A.38 38 WEEKS GESTATION OF PREGNANCY: Status: RESOLVED | Noted: 2021-03-10 | Resolved: 2021-03-20

## 2021-03-20 PROBLEM — O09.523 MULTIGRAVIDA OF ADVANCED MATERNAL AGE IN THIRD TRIMESTER: Status: RESOLVED | Noted: 2021-03-10 | Resolved: 2021-03-20

## 2021-03-20 PROBLEM — O47.1 FALSE LABOR AFTER 37 WEEKS OF GESTATION WITHOUT DELIVERY: Status: RESOLVED | Noted: 2017-12-30 | Resolved: 2021-03-20

## 2021-03-20 PROBLEM — O48.0 POST-DATES PREGNANCY: Status: RESOLVED | Noted: 2018-01-03 | Resolved: 2021-03-20

## 2021-03-20 PROBLEM — Z98.891 PREVIOUS CESAREAN SECTION: Status: RESOLVED | Noted: 2021-01-19 | Resolved: 2021-03-20

## 2021-03-20 PROCEDURE — 0503F POSTPARTUM CARE VISIT: CPT | Performed by: NURSE PRACTITIONER

## 2021-03-20 RX ORDER — IBUPROFEN 600 MG/1
600 TABLET ORAL EVERY 6 HOURS
Qty: 30 TABLET | Refills: 0 | Status: SHIPPED | OUTPATIENT
Start: 2021-03-20

## 2021-03-20 RX ORDER — IBUPROFEN 600 MG/1
600 TABLET ORAL EVERY 6 HOURS
Qty: 30 TABLET | Refills: 0 | Status: SHIPPED | OUTPATIENT
Start: 2021-03-20 | End: 2021-03-20

## 2021-03-20 RX ADMIN — PRENATAL VITAMINS-IRON FUMARATE 27 MG IRON-FOLIC ACID 0.8 MG TABLET 1 TABLET: at 10:25

## 2021-03-20 RX ADMIN — IBUPROFEN 600 MG: 600 TABLET, FILM COATED ORAL at 10:25

## 2021-03-20 RX ADMIN — ACETAMINOPHEN 650 MG: 325 TABLET ORAL at 06:53

## 2021-03-20 RX ADMIN — ACETAMINOPHEN 650 MG: 325 TABLET ORAL at 13:52

## 2021-03-20 RX ADMIN — IBUPROFEN 600 MG: 600 TABLET, FILM COATED ORAL at 04:23

## 2021-03-20 RX ADMIN — ACETAMINOPHEN 650 MG: 325 TABLET ORAL at 00:51

## 2021-03-20 NOTE — DISCHARGE INSTR - APPOINTMENTS
Call Dr. Cho's office on Monday, March 22nd to make an appointment to remove staples one day next jerome.

## 2021-03-20 NOTE — DISCHARGE SUMMARY
Discharge Summary    Date of Admission: 3/17/2021  Date of Discharge:  3/20/2021      Patient: Steven Watt      MR#:[id number]    Delivery Provider: Cristopher Cho     Presenting Problem/History of Present Illness  Previous  delivery affecting pregnancy [O34.219]     Patient Active Problem List   Diagnosis   • AMA (advanced maternal age) primigravida 35+   • Late prenatal care   • Encounter for sterilization   • Previous  delivery affecting pregnancy         Discharge Diagnosis: repeat Csection with sterilization at 39w0d    Procedures:  , Low Transverse     3/17/2021    8:06 AM        Hospital Course  Patient is a 41 y.o. female  at 39w0d status post repeat Csection without complication. Postpartum the patient did well. She remained afebrile, with vital signs stable. She was ready for discharge on postpartum day 3.     Infant:   male  fetus 4095 g (9 lb 0.5 oz)  with Apgar scores of 8 , 9  at five minutes.    Condition on Discharge:  Stable    Vital Signs  Temp:  [98.1 °F (36.7 °C)-98.7 °F (37.1 °C)] 98.6 °F (37 °C)  Heart Rate:  [78-85] 78  Resp:  [18] 18  BP: (111-130)/(56-79) 124/71    Lab Results   Component Value Date    WBC 6.93 2021    HGB 10.1 (L) 2021    HCT 31.3 (L) 2021    .6 (H) 2021     2021       Discharge Disposition  Home or Self Care    Discharge Medications     Discharge Medications      New Medications      Instructions Start Date   ibuprofen 600 MG tablet  Commonly known as: ADVIL,MOTRIN   600 mg, Oral, Every 6 Hours         Continue These Medications      Instructions Start Date   Prenatal 27-1 27-1 MG tablet tablet   1 tablet, Oral, Daily      SLOW FE PO   1 tablet, Oral, Daily             Discharge Diet:  as tolerated    Activity at Discharge:  no driving x 2 wks.  No tampons, intercourse, tub baths x6 wks    Follow-up Appointments  Future Appointments   Date Time Provider Department Center   3/31/2021  2:10 PM  Cristopher Cho MD [facility] OB  [facility]     Additional Instructions for the Follow-ups that You Need to Schedule     Discharge Follow-up with Specified Provider: dalton; 2 Weeks   As directed      To: dalton    Follow Up: 2 Weeks               Margie Hernandez, MANN  03/20/21  10:09 EDT  Csd

## 2021-03-22 NOTE — PAYOR COMM NOTE
"Steven Watt (41 y.o. Female)     Auth#[id number]    Discharged 3/20/21 with Baby.    1 day was approved and you are secondary to V[place]ito.  Do we needed additional days approved?    From: Sheryl Grace  #[phone]  Fax#[phone]      Date of Birth Social Security Number Address Home Phone MRN    1980  [address] [place] [address] [phone] [id number]    Restoration Marital Status          None Single       Admission Date Admission Type Admitting Provider Attending Provider Department, Room/Bed    3/17/21 Elective Cristopher Cho MD  [facility] 4A, N419/1    Discharge Date Discharge Disposition Discharge Destination        3/20/2021 Home or Self Care              Attending Provider: (none)   Allergies: No Known Allergies    Isolation: None   Infection: None   Code Status: Prior    Ht: 159 cm (62.6\")   Wt: 97.1 kg (214 lb)    Admission Cmt: None   Principal Problem: None                Active Insurance as of 3/17/2021     Primary Coverage     Payor Plan Insurance Group Employer/Plan Group    ANTHEM BLUE CROSS ANTHEM BLUE CROSS BLUE SHIELD PPO [id number]     Payor Plan Address Payor Plan Phone Number Payor Plan Fax Number Effective Dates    [address] [phone]  1/1/2020 - None Entered    [facility] [id number]       Subscriber Name Subscriber Birth Date Member ID       STEVEN WATT 1980 [id number]           Secondary Coverage     Payor Plan Insurance Group Employer/Plan Group    [employer] [place] [employer] MEDICAID      Payor Plan Address Payor Plan Phone Number Payor Plan Fax Number Effective Dates    [address] [address] [phone]  2/3/2021 - None Entered    [facility] [id number]       Subscriber Name Subscriber Birth Date Member ID       STEVEN WATT 1980 [id number]                 Emergency Contacts      (Rel.) Home Phone Work Phone Mobile Phone    Betsy Watt (Sister) [phone] -- --               Operative/Procedure Notes " (last 7 days) (Notes from 03/15/21 1003 through 21 1003)      Cristopher Cho MD at 21 0756          Operative Note    Patient name: Steven Watt  YOB: 1980   MRN: [id number]  Admission Date: 3/17/2021  Referring Provider: Cristopher Cho MD    ID: 41 y.o.  at 39w0d    Preoperative Diagnosis:   Patient Active Problem List   Diagnosis   • AMA (advanced maternal age) primigravida 35+   • False labor after 37 weeks of gestation without delivery   • Post-dates pregnancy   • Late prenatal care   • Previous  section   • 38 weeks gestation of pregnancy   • Multigravida of advanced maternal age in third trimester   • Encounter for sterilization   • Previous  delivery affecting pregnancy       Postoperative Diagnosis: Same as above.    Procedure(s): repeatlow transverse  delivery   Tubal sterilization bilateral segmental salpingectomy  Surgeons: Surgeon(s) and Role:     * Cristopher Cho MD - Primary    Staff:  Surgeon(s):  Cristopher Cho MD    Assistant: Sintia Segovia    Anesthesia: Spinal    Estimated Blood Loss: 500 mL mL    IV Fluids: [unfilled]    Preoperative antibiotic: Ancef (cefazolin) 2 grams    Blood products:   Blood Administration Record (From admission, onward)    None          Pathology:   Order Name Source Comment Collection Info Order Time   TISSUE PATHOLOGY EXAM Fallopian Tubes, Bilateral  Collected By: Cristopher Cho MD 3/17/2021  8:22 AM       Drains: Dowling catheter to gravity    Complications: None    Condition: Stable to recovery room      Infant:                Gender: male  infant    Weight: 4095 g (9 lb 0.5 oz)     Apgars: 8  @ 1 minute /     9  @ 5 minutes    Cord gases: Venous:  No components found for:  PHCVEN,  BECVEN      Arterial:  No components found for:  PHCART,  BECART          Operative Summary:   After obtaining informed consent the patient was taken to the operating room where adequate anesthesia was  obtained.  Dowling catheter was placed in the bladder preoperatively.  IV antibiotics were given preoperatively.       The abdomen was prepped and draped in the usual sterile fashion for  delivery.  After confirming adequate anesthesia a Pfannenstiel skin incision was made with the scalpel and carried through to the underlying layer of fascia.  The fascia was incised in the midline and the incision extended laterally with the Larson scissors and with blunt dissection.       The upper aspect of the fascia was grasped with 2 Kocher clamps, elevated, and dissected off the underlying rectus muscles bluntly and with the Larson scissors.  The Kocher clamps were removed and applied to the inferior aspect of the fascia.  The fascia was dissected off of the rectus muscles in the same fashion.  The peritoneum was entered bluntly.  The incision was stretched and the bladder blade and Silveira retractor inserted for visualization of the uterus.      The uterus was incised with the scalpel in a low transverse fashion.  The uterine incision was entered digitally and the incision extended bluntly in a cranial-caudal fashion.  Retractors were removed and membranes were ruptured.  The infant was delivered atraumatically from cephalic presentation.  The umbilical cord was clamped and cut and the nose and mouth bulb suctioned.  The infant was handed off to waiting pediatric staff.      Cord blood gases were not collected.  Cord blood was collected.  The placenta was removed using cord traction and uterine massage.  The uterus was exteriorized and cleared of all clots and debris.  The uterine incision was repaired with 1-0 Chromic in a running locked fashion. A single-layer technique was used.  Additional hemostatic measures required: electrocautery and figure-of-eight sutures.    Attention was turned to the tubal sterilization.  Each tube was isolated doubled at midway. Tied with 0 plain suture x2, the knuckles above the ties were  cut and sent to the lab for confirming diagnosis.  The fimbriated ends were identified to confirm the structure.    The incision was inspected and excellent hemostasis was noted.  The tubes and ovaries were noted to be normal. The uterus was returned to the abdomen.  The gutters were cleared of all clots and debris.  Irrigation was used.  The uterine incision was again inspected and found to be hemostatic.      The peritoneum was reapproximated with 2.0 Vicryl .  The fascia was closed with 0 Vicryl  in a running fashion (two segments).  The subcutaneous space was reapproximated using 3.0 Plain.      The skin was closed using staples.  The patient was transferred to the recovery room in stable condition.            Cristopher Cho MD  3/17/2021  08:53 EDT      Electronically signed by Cristopher Cho MD at 21 0856     Cristopher Cho MD at 21 0856      Summary: See  op note      See  op note    Electronically signed by Cristopher Cho MD at 21 0914          Discharge Summary      Margie Hernandez APRN at 21 1009          Discharge Summary    Date of Admission: 3/17/2021  Date of Discharge:  3/20/2021      Patient: Steven Watt      MR#:[id number]    Delivery Provider: Cristopher Cho     Presenting Problem/History of Present Illness  Previous  delivery affecting pregnancy [O34.219]     Patient Active Problem List   Diagnosis   • AMA (advanced maternal age) primigravida 35+   • Late prenatal care   • Encounter for sterilization   • Previous  delivery affecting pregnancy         Discharge Diagnosis: repeat Csection with sterilization at 39w0d    Procedures:  , Low Transverse     3/17/2021    8:06 AM        Hospital Course  Patient is a 41 y.o. female  at 39w0d status post repeat Csection without complication. Postpartum the patient did well. She remained afebrile, with vital signs stable. She was ready for discharge on  postpartum day 3.     Infant:   male  fetus 4095 g (9 lb 0.5 oz)  with Apgar scores of 8 , 9  at five minutes.    Condition on Discharge:  Stable    Vital Signs  Temp:  [98.1 °F (36.7 °C)-98.7 °F (37.1 °C)] 98.6 °F (37 °C)  Heart Rate:  [78-85] 78  Resp:  [18] 18  BP: (111-130)/(56-79) 124/71    Lab Results   Component Value Date    WBC 6.93 2021    HGB 10.1 (L) 2021    HCT 31.3 (L) 2021    .6 (H) 2021     2021       Discharge Disposition  Home or Self Care    Discharge Medications     Discharge Medications      New Medications      Instructions Start Date   ibuprofen 600 MG tablet  Commonly known as: ADVIL,MOTRIN   600 mg, Oral, Every 6 Hours         Continue These Medications      Instructions Start Date   Prenatal 27-1 27-1 MG tablet tablet   1 tablet, Oral, Daily      SLOW FE PO   1 tablet, Oral, Daily             Discharge Diet:  as tolerated    Activity at Discharge:  no driving x 2 wks.  No tampons, intercourse, tub baths x6 wks    Follow-up Appointments  Future Appointments   Date Time Provider Department Center   3/31/2021  2:10 PM Cristopher Cho MD [facility]  [facility]     Additional Instructions for the Follow-ups that You Need to Schedule     Discharge Follow-up with Specified Provider: dalton; 2 Weeks   As directed      To: dalton    Follow Up: 2 Weeks               MANN Anglin  21  10:09 EDT  Csd          Electronically signed by Margie Hernandez APRN at 21 1011

## 2021-03-23 ENCOUNTER — POSTPARTUM VISIT (OUTPATIENT)
Dept: OBSTETRICS AND GYNECOLOGY | Facility: CLINIC | Age: 41
End: 2021-03-23

## 2021-03-23 VITALS — SYSTOLIC BLOOD PRESSURE: 138 MMHG | DIASTOLIC BLOOD PRESSURE: 88 MMHG | BODY MASS INDEX: 38.07 KG/M2 | WEIGHT: 212.2 LBS

## 2021-03-23 PROCEDURE — 0503F POSTPARTUM CARE VISIT: CPT | Performed by: NURSE PRACTITIONER

## 2021-03-23 NOTE — PROGRESS NOTES
Chief Complaint   Patient presents with   • Postpartum Care     staple removal        1 Week Postpartum Visit and Staple removal       Steven Watt is a 41 y.o.  s/p Repeat  and bilateral segmental salpingectomy at 39 0/7 weeks on 3/17/2021, who presents today for a 1 week postpartum check and staple removal.       The incision and is healing well. She reports that she has had issues with swelling since delivery.  Swelling is occurring in her legs, feet and ankles.  She denies any associated discomfort with the swelling. She says that she is elevating when she can, but swelling returns immediately when she puts down.  She is also wearing compression hose, but they do not seem to be helping.     Patient denies postpartum depression.  Patient describes bleeding as light.  Patient is bottle feeding.   contraceptive methods: Tubal ligation for contraception at the time of section.  Patient denies bowel or bladder issues.    Denies HA, vision changes or epigastric pain.     Postpartum Depression Screening Questionnaire: Was completed and she scored a 0. no treatment indicated.  Baby Name: Male- Franco  Baby Weight: 9 #  Baby Discharged: Discharged with Mom  Delivering Physician: CAROLE    The additional following portions of the patient's history were reviewed and updated as appropriate: allergies, current medications, past family history, past medical history, past social history, past surgical history and problem list.    Review of Systems   Cardiovascular: Positive for leg swelling (Bilateral ).   All other systems reviewed and are negative.    All other systems reviewed and are negative.     I have reviewed and agree with the HPI, ROS, and historical information as entered above. Lavern Pickett, APRN    /88   Wt 96.3 kg (212 lb 3.2 oz)   LMP  (LMP Unknown)   Breastfeeding No   BMI 38.07 kg/m²     Physical Exam  Vitals and nursing note reviewed. Exam conducted with a chaperone present.    Constitutional:       Appearance: She is well-developed.   HENT:      Head: Normocephalic and atraumatic.   Neck:      Thyroid: No thyroid mass or thyromegaly.   Pulmonary:      Breath sounds: No rhonchi.   Abdominal:      Palpations: Abdomen is soft. Abdomen is not rigid. There is no mass.      Tenderness: There is no abdominal tenderness. There is no guarding.      Hernia: No hernia is present.      Comments: Incision C/D/I, staples removed, steri strips applied   Genitourinary:     Vagina: Normal.      Cervix: Normal.      Uterus: Normal.    Musculoskeletal:         General: Swelling (BLE swelling, 1+, equal, no erythema or warmth ) present.      Cervical back: Normal range of motion. No muscular tenderness.   Neurological:      Mental Status: She is alert and oriented to person, place, and time.   Psychiatric:         Behavior: Behavior normal.             Assessment and Plan    Problem List Items Addressed This Visit     None      Visit Diagnoses     Postpartum follow-up    -  Primary    staple removal          1. S/p , 1 weeks postpartum.  Doing well.   2. Incision healing well, staples removed, steri strips applied.  3. Bottle feeding.  4. Repeat /80, call with HA's, vision changes, elevated BP at home.    5. Follow up in 1 week with Dr Cho.     Lavern Pickett, APRN  2021

## 2021-03-31 ENCOUNTER — POSTPARTUM VISIT (OUTPATIENT)
Dept: OBSTETRICS AND GYNECOLOGY | Facility: CLINIC | Age: 41
End: 2021-03-31

## 2021-03-31 VITALS — SYSTOLIC BLOOD PRESSURE: 142 MMHG | BODY MASS INDEX: 34.27 KG/M2 | DIASTOLIC BLOOD PRESSURE: 84 MMHG | WEIGHT: 191 LBS

## 2021-03-31 PROCEDURE — 0503F POSTPARTUM CARE VISIT: CPT | Performed by: OBSTETRICS & GYNECOLOGY

## 2021-03-31 NOTE — PROGRESS NOTES
Chief Complaint   Patient presents with   • Postpartum Care       2 Week Postpartum Visit         Steven Watt is a 41 y.o.  s/p tubal and repeat .  Additional problems reported: none. at 39 weeks on 2021, who presents today for a 2 week postpartum check.      At the time of delivery were you diagnosed with any of the following: None.    Patient denies postpartum depression.  Patient describes bleeding as light.  Patient is bottle feeding.  Desires contraceptive methods: pt had tubal for contraception.  Denies bowel or bladder issues. Pt had staples removed with NP 3/23 and the steri strisp are gone  As of today but the left side the inc looks different. /84 also neck pain after sleeping funny     Postpartum Depression Screening Questionnaire: already filled out and , no treatment indicated.  Baby Name: Franco  Baby Weight: 9lbs  Baby Discharged: Discharged with Mom  Delivering Physician: Marquis    Last Completed Pap Smear       Status Date      PAP SMEAR No completions recorded        Is the patient due for a pap today? No    The additional following portions of the patient's history were reviewed and updated as appropriate: allergies, current medications, past family history, past medical history, past social history, past surgical history and problem list.    Review of Systems   Constitutional: Negative.    HENT: Negative.    Eyes: Negative.    Respiratory: Negative.    Cardiovascular: Negative.    Gastrointestinal: Negative.    Endocrine: Negative.    Genitourinary: Negative.    Musculoskeletal:        Neck pain   Allergic/Immunologic: Negative.    Neurological: Negative.    Hematological: Negative.    Psychiatric/Behavioral: Negative.      All other systems reviewed and are negative.     I have reviewed and agree with the HPI, ROS, and historical information as entered above. Cristopher Cho MD    /84   Wt 86.6 kg (191 lb)   LMP  (LMP Unknown)   BMI 34.27 kg/m²      Physical Exam   Abdomen soft no hepatosplenomegaly, incision healing well no induration or drainage        Assessment and Plan    Problem List Items Addressed This Visit        Gravid and     Postpartum follow-up - Primary    Overview     2 weeks post  and tubal               1. S/p , 6 weeks postpartum.  Doing well.    2. Return to normal physical activity.  No pelvic restrictions.   3. Contraception: contraceptive methods: Tubal ligation  4. Follow up for postpartum exam and Pap in 4 weeks    Cristopher Cho MD  2021

## 2021-04-28 ENCOUNTER — POSTPARTUM VISIT (OUTPATIENT)
Dept: OBSTETRICS AND GYNECOLOGY | Facility: CLINIC | Age: 41
End: 2021-04-28

## 2021-04-28 VITALS — BODY MASS INDEX: 34.23 KG/M2 | SYSTOLIC BLOOD PRESSURE: 116 MMHG | DIASTOLIC BLOOD PRESSURE: 80 MMHG | WEIGHT: 190.8 LBS

## 2021-04-28 PROCEDURE — 0503F POSTPARTUM CARE VISIT: CPT | Performed by: OBSTETRICS & GYNECOLOGY

## 2021-04-28 NOTE — PROGRESS NOTES
.  Chief Complaint   Patient presents with   • Postpartum Care       6 Week Postpartum Visit         Steven Watt is a 41 y.o.  s/p , due to previous c section at 39 weeks on 21, who presents today for a 6 week postpartum check.      At the time of delivery were you diagnosed with any of the following: None. The incision and is healing well.    Patient denies postpartum depression.  Patient describes bleeding as absent.  Patient is bottle feeding.  Desires contraceptive methods: Tubal ligation for contraception.  Patient denies bowel or bladder issues.    Postpartum Depression Screening Questionnaire: 0, no treatment indicated.  Baby Name: Franco  Baby Weight: 9lb  Baby Discharged: Discharged with Mom  Delivering Physician: Dr. Cho    Last Completed Pap Smear       Status Date      PAP SMEAR No completions recorded        Is the patient due for a pap today? Yes.  Patient Declined     The additional following portions of the patient's history were reviewed and updated as appropriate: allergies, current medications, past family history, past medical history, past social history and past surgical history.    Review of Systems  All other systems reviewed and are negative.     I have reviewed and agree with the HPI, ROS, and historical information as entered above. Grace Hawley MA    There were no vitals taken for this visit.    Physical Exam        Assessment and Plan    Problem List Items Addressed This Visit     None          1. S/p , 6 weeks postpartum.  Doing well.    2. Return to normal physical activity.  No pelvic restrictions.   3. Contraception: contraceptive methods: Tubal ligation  4. Follow up for Annual.     Grace Hawley MA  2021

## 2022-02-02 ENCOUNTER — OFFICE VISIT (OUTPATIENT)
Dept: OBSTETRICS AND GYNECOLOGY | Facility: CLINIC | Age: 42
End: 2022-02-02

## 2022-02-02 VITALS
WEIGHT: 212 LBS | DIASTOLIC BLOOD PRESSURE: 70 MMHG | BODY MASS INDEX: 39.01 KG/M2 | HEIGHT: 62 IN | SYSTOLIC BLOOD PRESSURE: 110 MMHG

## 2022-02-02 DIAGNOSIS — Z01.419 ROUTINE GYNECOLOGICAL EXAMINATION: Primary | ICD-10-CM

## 2022-02-02 PROCEDURE — 99396 PREV VISIT EST AGE 40-64: CPT | Performed by: OBSTETRICS & GYNECOLOGY

## 2022-02-02 NOTE — PROGRESS NOTES
GYN Annual Exam     CC - Here for annual exam.     Subjective   HPI  Steven Watt is a 41 y.o. female, , who presents for annual well woman exam.  She is premenopausal. Her last LMP was Patient's last menstrual period was 2022 (exact date)..  Periods are regular every 28-30 days, lasting 7 days.  Dysmenorrhea:none.  Patient reports problems with: none.  Partner Status: Marital Status: single.    Desires STD Screening: YES/NO/Other: no.    Additional OB/GYN History   Current contraception: BTL    Last Pap : 2018 negative  Last Completed Pap Smear     This patient has no relevant Health Maintenance data.        History of abnormal Pap smear: no  Family history of uterine, colon, breast, or ovarian cancer: no  Last mammogram: none  Last Completed Mammogram     This patient has no relevant Health Maintenance data.        Last colonoscopy: none  Last Completed Colonoscopy     This patient has no relevant Health Maintenance data.        Last DEXA: none   Exercises Regularly: no  Feelings of Anxiety or Depression: no    Tobacco Usage?: no    Health Maintenance   Topic Date Due   • Annual Gynecologic Pelvic and Breast Exam  Never done   • ANNUAL PHYSICAL  Never done   • PAP SMEAR  Never done   • INFLUENZA VACCINE  2021   • COVID-19 Vaccine (3 - Booster for Pfizer series) 2022   • TDAP/TD VACCINES (5 - Td or Tdap) 2030   • HEPATITIS C SCREENING  Completed   • Pneumococcal Vaccine 0-64  Aged Out       Current Outpatient Medications   Medication Sig Dispense Refill   • Ferrous Sulfate (SLOW FE PO) Take 1 tablet by mouth Daily.     • ibuprofen (ADVIL,MOTRIN) 600 MG tablet Take 1 tablet by mouth Every 6 (Six) Hours. 30 tablet 0   • Prenatal Vit-Fe Fumarate-FA (PRENATAL 27-) 27-1 MG tablet tablet Take 1 tablet by mouth Daily.       No current facility-administered medications for this visit.       The additional following portions of the patient's history were reviewed and updated as  "appropriate: problem list.    Review of Systems   All other systems reviewed and are negative.      I have reviewed and agree with the HPI, ROS, and historical information as entered above. Cristopher Cho MD    Objective   /70   Ht 157.5 cm (62\")   Wt 96.2 kg (212 lb)   LMP 01/09/2022 (Exact Date)   Breastfeeding No   BMI 38.78 kg/m²     PE    Breast: Without masses ,nontender, no skin changes or retractions  Axilla: Normal, no lymphadenopathy  Heart: Regular rate no murmurs rubs or gallops  Lungs: Clear to auscultation, normal breath sounds bilaterally  Abdomen: Soft nontender, no hepatosplenomegaly, no guarding or rebound, no masses  Pelvic exam  External genitalia: Normal introitus and vulva  Vagina: Normal mucosa no bleeding inflammation or discharge  Bladder: Normal position nontender  Urethral meatus and urethra: Normal nontender  Cervix: No lesions, no discharge, bleeding or inflammation  Bimanual: Nontender adnexa clear, no sign of uterine or ovarian enlargement    Assessment/Plan     Assessment     Problem List Items Addressed This Visit        Health Encounters    Routine gynecological examination - Primary    Relevant Orders    Liquid-based Pap Smear, Screening          1. GYN annual well woman exam.   2. Normal premenopausal    Plan     1. Next pap due in: 1 to 3 years  2. Reviewed HPV guidelines  3. Anticipatory menopausal guidance given.  Preventative health information reviewed  Cristopher Cho MD  02/02/2022  "

## (undated) DEVICE — SUT PLAIN  3/0 CT1 27IN 842H

## (undated) DEVICE — MAT PREVALON MOBL TRANSFR AIR WO/PAD 39X80IN

## (undated) DEVICE — PK C/SECT 10

## (undated) DEVICE — PROXIMATE RH ROTATING HEAD SKIN STAPLERS (35 WIDE) CONTAINS 35 STAINLESS STEEL STAPLES: Brand: PROXIMATE

## (undated) DEVICE — SUT GUT CHRM 1 CTX 36IN 905H

## (undated) DEVICE — SOL IRR NACL 0.9PCT BT 1000ML

## (undated) DEVICE — GLV SURG SENSICARE W/ALOE PF LF 8 STRL

## (undated) DEVICE — SUT VIC 2/0 CT1 27IN J339H BX/36

## (undated) DEVICE — SOL IRR H2O BTL 1000ML STRL

## (undated) DEVICE — SUT GUT PLN 0 STD TIE 54IN S104H

## (undated) DEVICE — TRY SPINE BLCK WHITACRE 25G 3X5IN